# Patient Record
Sex: MALE | Race: WHITE | NOT HISPANIC OR LATINO | Employment: UNEMPLOYED | RURAL
[De-identification: names, ages, dates, MRNs, and addresses within clinical notes are randomized per-mention and may not be internally consistent; named-entity substitution may affect disease eponyms.]

---

## 2017-02-01 ENCOUNTER — OFFICE VISIT (OUTPATIENT)
Dept: FAMILY MEDICINE CLINIC | Facility: CLINIC | Age: 22
End: 2017-02-01

## 2017-02-01 VITALS
TEMPERATURE: 98.3 F | DIASTOLIC BLOOD PRESSURE: 75 MMHG | HEIGHT: 70 IN | OXYGEN SATURATION: 98 % | WEIGHT: 175.6 LBS | HEART RATE: 80 BPM | BODY MASS INDEX: 25.14 KG/M2 | SYSTOLIC BLOOD PRESSURE: 113 MMHG

## 2017-02-01 DIAGNOSIS — Z87.09 H/O CHRONIC SINUSITIS: Primary | ICD-10-CM

## 2017-02-01 PROCEDURE — 99213 OFFICE O/P EST LOW 20 MIN: CPT | Performed by: FAMILY MEDICINE

## 2017-02-01 NOTE — PROGRESS NOTES
"Jared Simpson    1995    Chief Complaint   Patient presents with   • Follow-up       Vitals:    02/01/17 1501   BP: 113/75   Pulse: 80   Temp: 98.3 °F (36.8 °C)   SpO2: 98%   Weight: 175 lb 9.6 oz (79.7 kg)   Height: 70\" (177.8 cm)       Sinusitis   This is a chronic problem. The current episode started more than 1 year ago. The problem has been resolved since onset. There has been no fever. He is experiencing no pain. Associated symptoms include congestion. Past treatments include spray decongestants (ENT-endoscopy). The treatment provided moderate relief.       Review of Systems   HENT: Positive for congestion and facial swelling.        Past Medical History   Diagnosis Date   • ADHD (attention deficit hyperactivity disorder)    • Allergic    • Anxiety    • Arm injury      right   • Depression    • Headache    • Wrist injury          Current Outpatient Prescriptions:   •  buPROPion XL (WELLBUTRIN XL) 150 MG 24 hr tablet, , Disp: , Rfl:   •  Cholecalciferol (VITAMIN D) 2000 UNITS tablet, Take 2,000 Units by mouth Daily., Disp: , Rfl:   •  CloNIDine (CATAPRES) 0.1 MG tablet, Take 0.1 mg by mouth As Needed for high blood pressure., Disp: , Rfl:   •  KRILL OIL PO, Take 1 tablet by mouth. TAKE 350MG DAILY., Disp: , Rfl:   •  lithium carbonate 150 MG capsule, 300 mg 3 (Three) Times a Day., Disp: , Rfl:   •  propranolol (INDERAL) 10 MG tablet, TAKE 1 TABLET BY MOUTH TWICE A DAY, Disp: , Rfl: 2  •  QUEtiapine (SEROquel) 100 MG tablet, TAKE 1 TABLET BY MOUTH AT BEDTIME, Disp: , Rfl: 2  •  topiramate (TOPAMAX) 100 MG tablet, Take 150 mg by mouth Daily., Disp: , Rfl:     No Known Allergies    Patient Active Problem List   Diagnosis   • Bipolar 1 disorder       Social History     Social History   • Marital status: Single     Spouse name: N/A   • Number of children: N/A   • Years of education: N/A     Social History Main Topics   • Smoking status: Current Every Day Smoker     Packs/day: 0.75     Years: 6.00     Types: " "Cigarettes   • Smokeless tobacco: None   • Alcohol use 3.6 oz/week     6 Cans of beer per week      Comment: oc.   • Drug use: Yes     Special: Marijuana      Comment: former   • Sexual activity: Defer     Other Topics Concern   • None     Social History Narrative       Past Surgical History   Procedure Laterality Date   • Tonsillectomy     • Rhinoplasty         Physical Exam   HENT:   Head: Normocephalic.   Right Ear: External ear normal.   Left Ear: External ear normal.   Mouth/Throat: Oropharynx is clear and moist.   Cardiovascular: Normal rate and regular rhythm.    Pulmonary/Chest: Effort normal and breath sounds normal.   Neurological: He is alert.   Psychiatric: He has a normal mood and affect.   Vitals reviewed.      Vitals:    02/01/17 1501   BP: 113/75   Pulse: 80   Temp: 98.3 °F (36.8 °C)   SpO2: 98%   Weight: 175 lb 9.6 oz (79.7 kg)   Height: 70\" (177.8 cm)       Jared was seen today for follow-up.    Diagnoses and all orders for this visit:    H/O chronic sinusitis        Problem List Items Addressed This Visit     None      Visit Diagnoses     H/O chronic sinusitis    -  Primary                      Chronic sinusitis-cleared by ENT-continue Flonase twice a day-had flu shot this fall            Morris Espitia MD  2/1/2017  "

## 2017-02-10 RX ORDER — FLUTICASONE PROPIONATE 50 MCG
2 SPRAY, SUSPENSION (ML) NASAL DAILY
Qty: 16 ML | Refills: 5 | Status: SHIPPED | OUTPATIENT
Start: 2017-02-10 | End: 2017-10-21 | Stop reason: SDUPTHER

## 2017-02-22 ENCOUNTER — OFFICE VISIT (OUTPATIENT)
Dept: FAMILY MEDICINE CLINIC | Facility: CLINIC | Age: 22
End: 2017-02-22

## 2017-02-22 VITALS — WEIGHT: 174 LBS | HEIGHT: 70 IN | TEMPERATURE: 99 F | BODY MASS INDEX: 24.91 KG/M2

## 2017-02-22 DIAGNOSIS — R19.7 DIARRHEA OF PRESUMED INFECTIOUS ORIGIN: Primary | ICD-10-CM

## 2017-02-22 PROCEDURE — 99213 OFFICE O/P EST LOW 20 MIN: CPT | Performed by: FAMILY MEDICINE

## 2017-02-22 NOTE — PROGRESS NOTES
"Jared Simpson    1995    Chief Complaint   Patient presents with   • Diarrhea       Vitals:    02/22/17 1404   Temp: 99 °F (37.2 °C)   TempSrc: Oral   Weight: 174 lb (78.9 kg)   Height: 70\" (177.8 cm)       Diarrhea    This is a chronic problem. The current episode started more than 1 month ago. The problem occurs 2 to 4 times per day. The problem has been unchanged. The stool consistency is described as watery. The patient states that diarrhea does not awaken him from sleep. Associated symptoms include abdominal pain. Nothing aggravates the symptoms. Risk factors: Well water exposure approximately 2 months ago. He has tried nothing for the symptoms.       Review of Systems   Gastrointestinal: Positive for abdominal pain and diarrhea.       Past Medical History   Diagnosis Date   • ADHD (attention deficit hyperactivity disorder)    • Allergic    • Anxiety    • Arm injury      right   • Depression    • Headache    • Wrist injury          Current Outpatient Prescriptions:   •  buPROPion XL (WELLBUTRIN XL) 150 MG 24 hr tablet, , Disp: , Rfl:   •  Cholecalciferol (VITAMIN D) 2000 UNITS tablet, Take 2,000 Units by mouth Daily., Disp: , Rfl:   •  CloNIDine (CATAPRES) 0.1 MG tablet, Take 0.1 mg by mouth As Needed for high blood pressure., Disp: , Rfl:   •  fluticasone (FLONASE) 50 MCG/ACT nasal spray, 2 sprays into each nostril Daily., Disp: 16 mL, Rfl: 5  •  KRILL OIL PO, Take 1 tablet by mouth. TAKE 350MG DAILY., Disp: , Rfl:   •  lithium carbonate 150 MG capsule, 300 mg 3 (Three) Times a Day., Disp: , Rfl:   •  propranolol (INDERAL) 10 MG tablet, TAKE 1 TABLET BY MOUTH TWICE A DAY, Disp: , Rfl: 2  •  QUEtiapine (SEROquel) 100 MG tablet, TAKE 1 TABLET BY MOUTH AT BEDTIME, Disp: , Rfl: 2  •  topiramate (TOPAMAX) 100 MG tablet, Take 150 mg by mouth Daily., Disp: , Rfl:     No Known Allergies    Patient Active Problem List   Diagnosis   • Bipolar 1 disorder       Social History     Social History   • Marital status: " "Single     Spouse name: N/A   • Number of children: N/A   • Years of education: N/A     Social History Main Topics   • Smoking status: Current Every Day Smoker     Packs/day: 0.75     Years: 6.00     Types: Cigarettes   • Smokeless tobacco: None   • Alcohol use 3.6 oz/week     6 Cans of beer per week      Comment: oc.   • Drug use: Yes     Special: Marijuana      Comment: former   • Sexual activity: Defer     Other Topics Concern   • None     Social History Narrative       Past Surgical History   Procedure Laterality Date   • Tonsillectomy     • Rhinoplasty         Physical Exam   Constitutional: He appears well-developed and well-nourished.   Cardiovascular: Normal rate and regular rhythm.    Pulmonary/Chest: Effort normal and breath sounds normal.   Abdominal: Soft. Bowel sounds are normal.   No focality   Neurological: He is alert.   Psychiatric: He has a normal mood and affect.   Vitals reviewed.      Vitals:    02/22/17 1404   Temp: 99 °F (37.2 °C)   TempSrc: Oral   Weight: 174 lb (78.9 kg)   Height: 70\" (177.8 cm)       Jared was seen today for diarrhea.    Diagnoses and all orders for this visit:    Diarrhea of presumed infectious origin  -     CBC & Differential  -     TSH  -     T4, free  -     Comprehensive Metabolic Panel  -     Hepatitis panel, acute        Problem List Items Addressed This Visit     None      Visit Diagnoses     Diarrhea of presumed infectious origin    -  Primary    Relevant Orders    CBC & Differential    TSH    T4, free    Comprehensive Metabolic Panel    Hepatitis panel, acute                    Patient was at a rehabilitation center in [place] and numerous people had abdominal complaints.  This facility had well water.    Plan above plus stool for PCR evaluation                  Morris Espitia MD  2/22/2017  "

## 2017-02-24 LAB
ALBUMIN SERPL-MCNC: 4.9 G/DL (ref 3.5–5)
ALBUMIN/GLOB SERPL: 2.2 G/DL (ref 1.1–2.5)
ALP SERPL-CCNC: 80 U/L (ref 24–120)
ALT SERPL-CCNC: 30 U/L (ref 0–54)
AST SERPL-CCNC: 24 U/L (ref 7–45)
BASOPHILS # BLD AUTO: 0.03 10*3/MM3 (ref 0–0.2)
BASOPHILS NFR BLD AUTO: 0.3 % (ref 0–2)
BILIRUB SERPL-MCNC: 0.4 MG/DL (ref 0.1–1)
BUN SERPL-MCNC: 14 MG/DL (ref 5–21)
BUN/CREAT SERPL: 10.9 (ref 7–25)
CALCIUM SERPL-MCNC: 9.9 MG/DL (ref 8.4–10.4)
CHLORIDE SERPL-SCNC: 104 MMOL/L (ref 98–110)
CO2 SERPL-SCNC: 25 MMOL/L (ref 24–31)
CREAT SERPL-MCNC: 1.28 MG/DL (ref 0.5–1.4)
EOSINOPHIL # BLD AUTO: 0.48 10*3/MM3 (ref 0–0.7)
EOSINOPHIL NFR BLD AUTO: 4.7 % (ref 0–4)
ERYTHROCYTE [DISTWIDTH] IN BLOOD BY AUTOMATED COUNT: 14.5 % (ref 12–15)
GLOBULIN SER CALC-MCNC: 2.2 GM/DL
GLUCOSE SERPL-MCNC: 85 MG/DL (ref 70–100)
HAV IGM SERPL QL IA: NEGATIVE
HBV CORE IGM SERPL QL IA: NEGATIVE
HBV SURFACE AG SERPL QL IA: NEGATIVE
HCT VFR BLD AUTO: 46.3 % (ref 40–52)
HCV AB S/CO SERPL IA: <0.1 S/CO RATIO (ref 0–0.9)
HGB BLD-MCNC: 15.5 G/DL (ref 14–18)
LYMPHOCYTES # BLD AUTO: 1.88 10*3/MM3 (ref 0.72–4.86)
LYMPHOCYTES NFR BLD AUTO: 18.4 % (ref 15–45)
MCH RBC QN AUTO: 33 PG (ref 28–32)
MCHC RBC AUTO-ENTMCNC: 33.5 G/DL (ref 33–36)
MCV RBC AUTO: 98.5 FL (ref 82–95)
MONOCYTES # BLD AUTO: 0.87 10*3/MM3 (ref 0.19–1.3)
MONOCYTES NFR BLD AUTO: 8.5 % (ref 4–12)
NEUTROPHILS # BLD AUTO: 6.93 10*3/MM3 (ref 1.87–8.4)
NEUTROPHILS NFR BLD AUTO: 68.1 % (ref 39–78)
PLATELET # BLD AUTO: 269 10*3/MM3 (ref 130–400)
POTASSIUM SERPL-SCNC: 5 MMOL/L (ref 3.5–5.3)
PROT SERPL-MCNC: 7.1 G/DL (ref 6.3–8.7)
RBC # BLD AUTO: 4.7 10*6/MM3 (ref 4.8–5.9)
SODIUM SERPL-SCNC: 140 MMOL/L (ref 135–145)
T4 FREE SERPL-MCNC: 0.84 NG/DL (ref 0.78–2.19)
TSH SERPL DL<=0.005 MIU/L-ACNC: 2.77 MIU/ML (ref 0.47–4.68)
WBC # BLD AUTO: 10.19 10*3/MM3 (ref 4.8–10.8)

## 2017-03-01 DIAGNOSIS — R19.7 DIARRHEA, UNSPECIFIED TYPE: Primary | ICD-10-CM

## 2017-04-03 ENCOUNTER — OFFICE VISIT (OUTPATIENT)
Dept: GASTROENTEROLOGY | Facility: CLINIC | Age: 22
End: 2017-04-03

## 2017-04-03 VITALS
RESPIRATION RATE: 18 BRPM | SYSTOLIC BLOOD PRESSURE: 118 MMHG | OXYGEN SATURATION: 99 % | DIASTOLIC BLOOD PRESSURE: 84 MMHG | HEIGHT: 70 IN | BODY MASS INDEX: 24.54 KG/M2 | HEART RATE: 79 BPM | TEMPERATURE: 97.6 F | WEIGHT: 171.4 LBS

## 2017-04-03 DIAGNOSIS — R19.7 DIARRHEA, UNSPECIFIED TYPE: Primary | ICD-10-CM

## 2017-04-03 DIAGNOSIS — Z80.0 FAMILY HISTORY OF COLON CANCER: ICD-10-CM

## 2017-04-03 DIAGNOSIS — R10.9 ABDOMINAL CRAMPING: ICD-10-CM

## 2017-04-03 PROBLEM — R63.4 WEIGHT LOSS: Status: RESOLVED | Noted: 2017-04-03 | Resolved: 2017-04-03

## 2017-04-03 PROBLEM — R63.4 WEIGHT LOSS: Status: ACTIVE | Noted: 2017-04-03

## 2017-04-03 PROCEDURE — 99204 OFFICE O/P NEW MOD 45 MIN: CPT | Performed by: NURSE PRACTITIONER

## 2017-04-03 RX ORDER — RISPERIDONE 0.25 MG/1
0.12 TABLET, ORALLY DISINTEGRATING ORAL NIGHTLY
Refills: 2 | COMMUNITY
Start: 2017-03-19 | End: 2021-12-28 | Stop reason: SINTOL

## 2017-04-03 RX ORDER — SODIUM, POTASSIUM,MAG SULFATES 17.5-3.13G
2 SOLUTION, RECONSTITUTED, ORAL ORAL ONCE
Qty: 2 BOTTLE | Refills: 0 | Status: SHIPPED | OUTPATIENT
Start: 2017-04-03 | End: 2017-04-03

## 2017-04-03 NOTE — PATIENT INSTRUCTIONS
Diarrhea, Adult  Diarrhea is frequent loose and watery bowel movements. Diarrhea can make you feel weak and cause you to become dehydrated. Dehydration can make you tired and thirsty, cause you to have a dry mouth, and decrease how often you urinate. Diarrhea typically lasts 2-3 days. However, it can last longer if it is a sign of something more serious. It is important to treat your diarrhea as told by your health care provider.  HOME CARE INSTRUCTIONS  Eating and Drinking  Follow these recommendations as told by your health care provider:  · Take an oral rehydration solution (ORS). This is a drink that is sold at pharmacies and retail stores.  · Drink clear fluids, such as water, ice chips, diluted fruit juice, and low-calorie sports drinks.  · Eat bland, easy-to-digest foods in small amounts as you are able. These foods include bananas, applesauce, rice, lean meats, toast, and crackers.  · Avoid drinking fluids that contain a lot of sugar or caffeine, such as energy drinks, sports drinks, and soda.  · Avoid alcohol.  · Avoid spicy or fatty foods.  General Instructions  · Drink enough fluid to keep your urine clear or pale yellow.  · Wash your hands often. If soap and water are not available, use hand .  · Make sure that all people in your household wash their hands well and often.  · Take over-the-counter and prescription medicines only as told by your health care provider.  · Rest at home while you recover.  · Watch your condition for any changes.  · Take a warm bath to relieve any burning or pain from frequent diarrhea episodes.  · Keep all follow-up visits as told by your health care provider. This is important.  SEEK MEDICAL CARE IF:  · You have a fever.  · Your diarrhea gets worse.  · You have new symptoms.  · You cannot keep fluids down.  · You feel light-headed or dizzy.  · You have a headache  · You have muscle cramps.  SEEK IMMEDIATE MEDICAL CARE IF:  · You have chest pain.  · You feel extremely  weak or you faint.  · You have bloody or black stools or stools that look like tar.  · You have severe pain, cramping, or bloating in your abdomen.  · You have trouble breathing or you are breathing very quickly.  · Your heart is beating very quickly.  · Your skin feels cold and clammy.  · You feel confused.  · You have signs of dehydration, such as:    Dark urine, very little urine, or no urine.    Cracked lips.    Dry mouth.    Sunken eyes.    Sleepiness.    Weakness.     This information is not intended to replace advice given to you by your health care provider. Make sure you discuss any questions you have with your health care provider.     Document Released: 12/08/2003 Document Revised: 01/13/2017 Document Reviewed: 08/23/2016  [employer] Interactive Patient Education ©2016 [employer]  Irritable Bowel Syndrome, Adult  Irritable bowel syndrome (IBS) is not one specific disease. It is a group of symptoms that affects the organs responsible for digestion (gastrointestinal or GI tract).   To regulate how your GI tract works, your body sends signals back and forth between your intestines and your brain. If you have IBS, there may be a problem with these signals. As a result, your GI tract does not function normally. Your intestines may become more sensitive and overreact to certain things. This is especially true when you eat certain foods or when you are under stress.   There are four types of IBS. These may be determined based on the consistency of your stool:   · IBS with diarrhea.    · IBS with constipation.    · Mixed IBS.    · Unsubtyped IBS.    It is important to know which type of IBS you have. Some treatments are more likely to be helpful for certain types of IBS.   CAUSES   The exact cause of IBS is not known.  RISK FACTORS  You may have a higher risk of IBS if:  · You are a woman.  · You are younger than 45 years old.  · You have a family history of IBS.  · You have mental health problems.  · You have  had bacterial infection of your GI tract.  SIGNS AND SYMPTOMS   Symptoms of IBS vary from person to person. The main symptom is abdominal pain or discomfort. Additional symptoms usually include one or more of the following:   · Diarrhea, constipation, or both.    · Abdominal swelling or bloating.    · Feeling full or sick after eating a small or regular-size meal.    · Frequent gas.    · Mucus in the stool.    · A feeling of having more stool left after a bowel movement.    Symptoms tend to come and go. They may be associated with stress, psychiatric conditions, or nothing at all.   DIAGNOSIS   There is no specific test to diagnose IBS. Your health care provider will make a diagnosis based on a physical exam, medical history, and your symptoms. You may have other tests to rule out other conditions that may be causing your symptoms. These may include:   · Blood tests.    · X-rays.    · CT scan.  · Endoscopy and colonoscopy. This is a test in which your GI tract is viewed with a long, thin, flexible tube.  TREATMENT  There is no cure for IBS, but treatment can help relieve symptoms. IBS treatment often includes:   · Changes to your diet, such as:    Eating more fiber.    Avoiding foods that cause symptoms.    Drinking more water.    Eating regular, medium-sized portioned meals.  · Medicines. These may include:    Fiber supplements if you have constipation.    Medicine to control diarrhea (antidiarrheal medicines).    Medicine to help control muscle spasms in your GI tract (antispasmodic medicines).    Medicines to help with any mental health issues, such as antidepressants or tranquilizers.  · Therapy.    Talk therapy may help with anxiety, depression, or other mental health issues that can make IBS symptoms worse.  · Stress reduction.    Managing your stress can help keep symptoms under control.  HOME CARE INSTRUCTIONS   · Take medicines only as directed by your health care provider.  · Eat a healthy diet.    Avoid  foods and drinks with added sugar.    Include more whole grains, fruits, and vegetables gradually into your diet. This may be especially helpful if you have IBS with constipation.    Avoid any foods and drinks that make your symptoms worse. These may include dairy products and caffeinated or carbonated drinks.    Do not eat large meals.    Drink enough fluid to keep your urine clear or pale yellow.  · Exercise regularly. Ask your health care provider for recommendations of good activities for you.  · Keep all follow-up visits as directed by your health care provider. This is important.  SEEK MEDICAL CARE IF:   · You have constant pain.  · You have trouble or pain with swallowing.  · You have worsening diarrhea.  SEEK IMMEDIATE MEDICAL CARE IF:   · You have severe and worsening abdominal pain.    · You have diarrhea and:      You have a rash, stiff neck, or severe headache.      You are irritable, sleepy, or difficult to awaken.      You are weak, dizzy, or extremely thirsty.    · You have bright red blood in your stool or you have black tarry stools.    · You have unusual abdominal swelling that is painful.    · You vomit continuously.    · You vomit blood (hematemesis).    · You have both abdominal pain and a fever.         This information is not intended to replace advice given to you by your health care provider. Make sure you discuss any questions you have with your health care provider.     Document Released: 12/18/2006 Document Revised: 01/08/2016 Document Reviewed: 09/04/2015  [employer] Patient Education ©2016 [employer]

## 2017-04-03 NOTE — PROGRESS NOTES
Chief Complaint:   Chief Complaint   Patient presents with   • Diarrhea     happens after eating/    • tightness     on right side of abdomin          Patient ID: Jared Simpson is a 22 y.o. male     History of Present Illness:    This is a very pleasant 22-year-old male who was referred to our office by Dr. Trino santos with complaints of diarrhea, and abdominal cramping.  The patient states that his symptoms started approximately 1 month ago.  He states that it occurs 2-4 times a day.  He states that is worse with eating.  He states that soon after he eats he begins to have abdominal cramping and muscular to the bathroom quickly.  He states that he has really not found any type of relieving factors for this.    He denies any nausea, vomiting, epigastric pain or dysphagia.  He denies any fever or chills.  He denies any bright red blood per rectum or black tarry stools.  He denies any unintentional weight loss or loss of appetite.  He does state that he has a family history of colon cancer in a grandparent but is not sure which one.        Past Medical History:   Diagnosis Date   • ADHD (attention deficit hyperactivity disorder)    • Allergic    • Anxiety    • Arm injury     right   • Depression    • Headache    • Wrist injury        Past Surgical History:   Procedure Laterality Date   • RHINOPLASTY     • TONSILLECTOMY           Current Outpatient Prescriptions:   •  buPROPion XL (WELLBUTRIN XL) 150 MG 24 hr tablet, , Disp: , Rfl:   •  Cholecalciferol (VITAMIN D) 2000 UNITS tablet, Take 2,000 Units by mouth Daily., Disp: , Rfl:   •  lithium carbonate 150 MG capsule, 300 mg 3 (Three) Times a Day., Disp: , Rfl:   •  propranolol (INDERAL) 10 MG tablet, TAKE 1 TABLET BY MOUTH TWICE A DAY, Disp: , Rfl: 2  •  QUEtiapine (SEROquel) 100 MG tablet, TAKE 1 TABLET BY MOUTH AT BEDTIME, Disp: , Rfl: 2  •  topiramate (TOPAMAX) 100 MG tablet, Take 150 mg by mouth Daily., Disp: , Rfl:   •  CloNIDine (CATAPRES) 0.1 MG tablet, Take 0.1  "mg by mouth As Needed for high blood pressure., Disp: , Rfl:   •  fluticasone (FLONASE) 50 MCG/ACT nasal spray, 2 sprays into each nostril Daily., Disp: 16 mL, Rfl: 5  •  KRILL OIL PO, Take 1 tablet by mouth. TAKE 350MG DAILY., Disp: , Rfl:   •  risperiDONE (risperDAL) 1 MG tablet, TAKE 1 TABLET BY MOUTH AT BEDTIME, Disp: , Rfl: 2  •  sodium-potassium-magnesium sulfates (SUPREP BOWEL PREP) solution oral solution, Take 2 bottles by mouth 1 (One) Time for 1 dose. Split dose prep as directed by office instructions provided.  2 bottles = one kit., Disp: 2 bottle, Rfl: 0    No Known Allergies    Social History     Social History   • Marital status: Single     Spouse name: N/A   • Number of children: N/A   • Years of education: N/A     Occupational History   • Not on file.     Social History Main Topics   • Smoking status: Current Every Day Smoker     Packs/day: 0.75     Years: 6.00     Types: Cigarettes   • Smokeless tobacco: Not on file   • Alcohol use 3.6 oz/week     6 Cans of beer per week      Comment: oc.   • Drug use: Yes     Special: Marijuana      Comment: former   • Sexual activity: Defer     Other Topics Concern   • Not on file     Social History Narrative       History reviewed. No pertinent family history.    Vitals:    04/03/17 0850   BP: 118/84   BP Location: Left arm   Patient Position: Sitting   Cuff Size: Adult   Pulse: 79   Resp: 18   Temp: 97.6 °F (36.4 °C)   SpO2: 99%   Weight: 171 lb 6.4 oz (77.7 kg)   Height: 70\" (177.8 cm)       Review of Systems:    General:    Present -feeling well   Skin:    Not Present-Rash   HEENT:     Not Present-Acute visual changes or Acute hearing changes   Neck :    Not Present- swollen glands   Genitourinary:      Not Present- burning, frequency, urgency hematuria, dysuria,   Cardiovascular:   Not Present-chest pain, palpitations, or pressure   Respiratory:   Not Present- shortness of breath or cough   Gastrointestinal:  Musculoskeletal:  Neurological:  Psychiatric:   " Present as mentioned in the HP    Not Present. Recent gait disturbances.    Not Present-Seizures and weakness in extremities.    Not Present- Anxiety or Depression.       Physical Exam:    General Appearance:    Alert, cooperative, in no acute distress   Psych:    Mood appropriate    Eyes:          conjunctivae and sclerae normal, no   icterus, no pallor   ENMT:    Ears appear intact with no abnormalities noted oral mucosa moist   Neck:   No adenopathy, supple, trachea midline, no thyromegaly, no   carotid bruit, no JVD    Cardiovascular:    Regular rhythm and normal rate, normal S1 and S2, no            murmur, no gallop, no rub, no click   Gastrointestinal:     Inspection normal.  Normal bowel sounds, no masses, no organomegaly, soft round non-tender, non-distended, no guarding, no rebound or tenderness. No hepatosplenomegaly.   Skin:   No bleeding, bruising or rash   Neurologic:   nonfocal       Lab Results   Component Value Date    WBC 10.19 02/22/2017    HGB 15.5 02/22/2017    HCT 46.3 02/22/2017     02/22/2017        Lab Results   Component Value Date     02/22/2017    K 5.0 02/22/2017     02/22/2017    CO2 25.0 02/22/2017    BUN 14 02/22/2017    CREATININE 1.28 02/22/2017    BILITOT 0.4 02/22/2017    ALKPHOS 80 02/22/2017    ALT 30 02/22/2017    AST 24 02/22/2017       No results found for: INR    Assessment and Plan:    Jared was seen today for diarrhea and tightness.    Diagnoses and all orders for this visit:    Diarrhea, unspecified type  -     Case request colonoscopy    Abdominal cramping  -     Case request    Family history of colon cancer    Other orders  -     sodium-potassium-magnesium sulfates (SUPREP BOWEL PREP) solution oral solution; Take 2 bottles by mouth 1 (One) Time for 1 dose. Split dose prep as directed by office instructions provided.  2 bottles = one kit.        Patient Instructions   Diarrhea, Adult  Diarrhea is frequent loose and watery bowel movements. Diarrhea can  make you feel weak and cause you to become dehydrated. Dehydration can make you tired and thirsty, cause you to have a dry mouth, and decrease how often you urinate. Diarrhea typically lasts 2-3 days. However, it can last longer if it is a sign of something more serious. It is important to treat your diarrhea as told by your health care provider.  HOME CARE INSTRUCTIONS  Eating and Drinking  Follow these recommendations as told by your health care provider:  · Take an oral rehydration solution (ORS). This is a drink that is sold at pharmacies and retail stores.  · Drink clear fluids, such as water, ice chips, diluted fruit juice, and low-calorie sports drinks.  · Eat bland, easy-to-digest foods in small amounts as you are able. These foods include bananas, applesauce, rice, lean meats, toast, and crackers.  · Avoid drinking fluids that contain a lot of sugar or caffeine, such as energy drinks, sports drinks, and soda.  · Avoid alcohol.  · Avoid spicy or fatty foods.  General Instructions  · Drink enough fluid to keep your urine clear or pale yellow.  · Wash your hands often. If soap and water are not available, use hand .  · Make sure that all people in your household wash their hands well and often.  · Take over-the-counter and prescription medicines only as told by your health care provider.  · Rest at home while you recover.  · Watch your condition for any changes.  · Take a warm bath to relieve any burning or pain from frequent diarrhea episodes.  · Keep all follow-up visits as told by your health care provider. This is important.  SEEK MEDICAL CARE IF:  · You have a fever.  · Your diarrhea gets worse.  · You have new symptoms.  · You cannot keep fluids down.  · You feel light-headed or dizzy.  · You have a headache  · You have muscle cramps.  SEEK IMMEDIATE MEDICAL CARE IF:  · You have chest pain.  · You feel extremely weak or you faint.  · You have bloody or black stools or stools that look like  tar.  · You have severe pain, cramping, or bloating in your abdomen.  · You have trouble breathing or you are breathing very quickly.  · Your heart is beating very quickly.  · Your skin feels cold and clammy.  · You feel confused.  · You have signs of dehydration, such as:    Dark urine, very little urine, or no urine.    Cracked lips.    Dry mouth.    Sunken eyes.    Sleepiness.    Weakness.     This information is not intended to replace advice given to you by your health care provider. Make sure you discuss any questions you have with your health care provider.     Document Released: 12/08/2003 Document Revised: 01/13/2017 Document Reviewed: 08/23/2016  [employer] Interactive Patient Education ©2016 [employer]  Irritable Bowel Syndrome, Adult  Irritable bowel syndrome (IBS) is not one specific disease. It is a group of symptoms that affects the organs responsible for digestion (gastrointestinal or GI tract).   To regulate how your GI tract works, your body sends signals back and forth between your intestines and your brain. If you have IBS, there may be a problem with these signals. As a result, your GI tract does not function normally. Your intestines may become more sensitive and overreact to certain things. This is especially true when you eat certain foods or when you are under stress.   There are four types of IBS. These may be determined based on the consistency of your stool:   · IBS with diarrhea.    · IBS with constipation.    · Mixed IBS.    · Unsubtyped IBS.    It is important to know which type of IBS you have. Some treatments are more likely to be helpful for certain types of IBS.   CAUSES   The exact cause of IBS is not known.  RISK FACTORS  You may have a higher risk of IBS if:  · You are a woman.  · You are younger than 45 years old.  · You have a family history of IBS.  · You have mental health problems.  · You have had bacterial infection of your GI tract.  SIGNS AND SYMPTOMS   Symptoms of IBS  vary from person to person. The main symptom is abdominal pain or discomfort. Additional symptoms usually include one or more of the following:   · Diarrhea, constipation, or both.    · Abdominal swelling or bloating.    · Feeling full or sick after eating a small or regular-size meal.    · Frequent gas.    · Mucus in the stool.    · A feeling of having more stool left after a bowel movement.    Symptoms tend to come and go. They may be associated with stress, psychiatric conditions, or nothing at all.   DIAGNOSIS   There is no specific test to diagnose IBS. Your health care provider will make a diagnosis based on a physical exam, medical history, and your symptoms. You may have other tests to rule out other conditions that may be causing your symptoms. These may include:   · Blood tests.    · X-rays.    · CT scan.  · Endoscopy and colonoscopy. This is a test in which your GI tract is viewed with a long, thin, flexible tube.  TREATMENT  There is no cure for IBS, but treatment can help relieve symptoms. IBS treatment often includes:   · Changes to your diet, such as:    Eating more fiber.    Avoiding foods that cause symptoms.    Drinking more water.    Eating regular, medium-sized portioned meals.  · Medicines. These may include:    Fiber supplements if you have constipation.    Medicine to control diarrhea (antidiarrheal medicines).    Medicine to help control muscle spasms in your GI tract (antispasmodic medicines).    Medicines to help with any mental health issues, such as antidepressants or tranquilizers.  · Therapy.    Talk therapy may help with anxiety, depression, or other mental health issues that can make IBS symptoms worse.  · Stress reduction.    Managing your stress can help keep symptoms under control.  HOME CARE INSTRUCTIONS   · Take medicines only as directed by your health care provider.  · Eat a healthy diet.    Avoid foods and drinks with added sugar.    Include more whole grains, fruits, and  vegetables gradually into your diet. This may be especially helpful if you have IBS with constipation.    Avoid any foods and drinks that make your symptoms worse. These may include dairy products and caffeinated or carbonated drinks.    Do not eat large meals.    Drink enough fluid to keep your urine clear or pale yellow.  · Exercise regularly. Ask your health care provider for recommendations of good activities for you.  · Keep all follow-up visits as directed by your health care provider. This is important.  SEEK MEDICAL CARE IF:   · You have constant pain.  · You have trouble or pain with swallowing.  · You have worsening diarrhea.  SEEK IMMEDIATE MEDICAL CARE IF:   · You have severe and worsening abdominal pain.    · You have diarrhea and:      You have a rash, stiff neck, or severe headache.      You are irritable, sleepy, or difficult to awaken.      You are weak, dizzy, or extremely thirsty.    · You have bright red blood in your stool or you have black tarry stools.    · You have unusual abdominal swelling that is painful.    · You vomit continuously.    · You vomit blood (hematemesis).    · You have both abdominal pain and a fever.         This information is not intended to replace advice given to you by your health care provider. Make sure you discuss any questions you have with your health care provider.     Document Released: 12/18/2006 Document Revised: 01/08/2016 Document Reviewed: 09/04/2015  [employer] Interactive Patient Education ©2016 [employer]        Next follow-up appointment    The risks, benefits, and alternatives of colonoscopy were reviewed with the patient today.  Risks including perforation of the colon possibly requiring surgery or colostomy.  Additional risks include risk of bleeding from biopsies or removal of colon tissue.  There is also the risk of a drug reaction or problems with anesthesia.  This will be discussed with the further by the anesthesia team on the day of the  procedure.  Lastly there is a possibility of missing a colon polyp or cancer.  The benefits include the diagnosis and management of disease of the colon and rectum.  Alternatives to colonoscopy include barium enema, laboratory testing, radiographic evaluation, or no intervention.  The patient verbalizes understanding and agrees.      EMR Dragon/Transcription disclaimer:  Much of this encounter note is an electronic transcription/translation of spoken language to printed text. The electronic translation of spoken language may permit erroneous, or at times, nonsensical words or phrases to be inadvertently transcribed; although I have reviewed the note for such errors, some may still exist.

## 2017-04-13 LAB — LITHIUM SERPL-SCNC: 0.4 MMOL/L (ref 0.6–1.2)

## 2017-04-25 ENCOUNTER — OFFICE VISIT (OUTPATIENT)
Dept: FAMILY MEDICINE CLINIC | Facility: CLINIC | Age: 22
End: 2017-04-25

## 2017-04-25 VITALS
TEMPERATURE: 98.8 F | SYSTOLIC BLOOD PRESSURE: 110 MMHG | DIASTOLIC BLOOD PRESSURE: 80 MMHG | WEIGHT: 164.4 LBS | BODY MASS INDEX: 23.54 KG/M2 | HEIGHT: 70 IN | HEART RATE: 79 BPM | OXYGEN SATURATION: 97 %

## 2017-04-25 DIAGNOSIS — J06.9 ACUTE URI: Primary | ICD-10-CM

## 2017-04-25 PROCEDURE — 99213 OFFICE O/P EST LOW 20 MIN: CPT | Performed by: FAMILY MEDICINE

## 2017-04-25 PROCEDURE — 96372 THER/PROPH/DIAG INJ SC/IM: CPT | Performed by: FAMILY MEDICINE

## 2017-04-25 RX ORDER — CEFTRIAXONE SODIUM 250 MG/1
250 INJECTION, POWDER, FOR SOLUTION INTRAMUSCULAR; INTRAVENOUS ONCE
Status: COMPLETED | OUTPATIENT
Start: 2017-04-25 | End: 2017-04-25

## 2017-04-25 RX ORDER — AMOXICILLIN 500 MG/1
500 CAPSULE ORAL 3 TIMES DAILY
Qty: 21 CAPSULE | Refills: 0 | Status: SHIPPED | OUTPATIENT
Start: 2017-04-25 | End: 2017-05-02

## 2017-04-25 RX ADMIN — CEFTRIAXONE SODIUM 250 MG: 250 INJECTION, POWDER, FOR SOLUTION INTRAMUSCULAR; INTRAVENOUS at 13:32

## 2017-04-25 NOTE — PROGRESS NOTES
"Jared Simpson    1995    Chief Complaint   Patient presents with   • URI   • Earache     Bilateral       Vitals:    04/25/17 1316   BP: 110/80   BP Location: Right arm   Patient Position: Sitting   Cuff Size: Adult   Pulse: 79   Temp: 98.8 °F (37.1 °C)   TempSrc: Oral   SpO2: 97%   Weight: 164 lb 6.4 oz (74.6 kg)   Height: 70\" (177.8 cm)       URI    This is a new problem. The current episode started in the past 7 days. The problem has been gradually worsening. The maximum temperature recorded prior to his arrival was 100.4 - 100.9 F. Associated symptoms include congestion, coughing, ear pain, sinus pain and sneezing. Pertinent negatives include no diarrhea or wheezing. He has tried antihistamine for the symptoms. The treatment provided no relief.       Review of Systems   HENT: Positive for congestion, ear pain and sneezing.    Respiratory: Positive for cough. Negative for shortness of breath, wheezing and stridor.    Gastrointestinal: Negative for diarrhea.       Past Medical History:   Diagnosis Date   • ADHD (attention deficit hyperactivity disorder)    • Allergic    • Anxiety    • Arm injury     right   • Depression    • Headache    • Wrist injury          Current Outpatient Prescriptions:   •  buPROPion XL (WELLBUTRIN XL) 150 MG 24 hr tablet, , Disp: , Rfl:   •  Cholecalciferol (VITAMIN D) 2000 UNITS tablet, Take 2,000 Units by mouth Daily., Disp: , Rfl:   •  CloNIDine (CATAPRES) 0.1 MG tablet, Take 0.1 mg by mouth As Needed for high blood pressure., Disp: , Rfl:   •  fluticasone (FLONASE) 50 MCG/ACT nasal spray, 2 sprays into each nostril Daily., Disp: 16 mL, Rfl: 5  •  KRILL OIL PO, Take 1 tablet by mouth. TAKE 350MG DAILY., Disp: , Rfl:   •  lithium carbonate 150 MG capsule, 300 mg 3 (Three) Times a Day., Disp: , Rfl:   •  propranolol (INDERAL) 10 MG tablet, TAKE 1 TABLET BY MOUTH TWICE A DAY, Disp: , Rfl: 2  •  QUEtiapine (SEROquel) 100 MG tablet, TAKE 1 TABLET BY MOUTH AT BEDTIME, Disp: , Rfl: 2  •  " risperiDONE (risperDAL) 1 MG tablet, TAKE 1 TABLET BY MOUTH AT BEDTIME, Disp: , Rfl: 2  •  topiramate (TOPAMAX) 100 MG tablet, Take 150 mg by mouth Daily., Disp: , Rfl:   •  amoxicillin (AMOXIL) 500 MG capsule, Take 1 capsule by mouth 3 (Three) Times a Day for 7 days., Disp: 21 capsule, Rfl: 0    Current Facility-Administered Medications:   •  cefTRIAXone (ROCEPHIN) injection 250 mg, 250 mg, Intramuscular, Once, Morris Espitia MD    No Known Allergies    Patient Active Problem List   Diagnosis   • Bipolar 1 disorder   • Diarrhea   • Abdominal cramping   • Family history of colon cancer       Social History     Social History   • Marital status: Single     Spouse name: N/A   • Number of children: N/A   • Years of education: N/A     Social History Main Topics   • Smoking status: Current Every Day Smoker     Packs/day: 0.75     Years: 6.00     Types: Cigarettes   • Smokeless tobacco: None   • Alcohol use 3.6 oz/week     6 Cans of beer per week      Comment: oc.   • Drug use: Yes     Special: Marijuana      Comment: former   • Sexual activity: Defer     Other Topics Concern   • None     Social History Narrative       Past Surgical History:   Procedure Laterality Date   • RHINOPLASTY     • TONSILLECTOMY         Physical Exam   Constitutional: He is oriented to person, place, and time. He appears well-developed and well-nourished.   HENT:   Head: Normocephalic.   Left Ear: External ear normal.   Mouth/Throat: Oropharynx is clear and moist.   Right otitis media   Eyes: Conjunctivae are normal.   Cardiovascular: Normal rate and regular rhythm.    Pulmonary/Chest: Effort normal and breath sounds normal. He has no wheezes. He has no rales.   Lymphadenopathy:     He has no cervical adenopathy.   Neurological: He is alert and oriented to person, place, and time.   Psychiatric: He has a normal mood and affect.   Vitals reviewed.      Vitals:    04/25/17 1316   BP: 110/80   BP Location: Right arm   Patient Position:  "Sitting   Cuff Size: Adult   Pulse: 79   Temp: 98.8 °F (37.1 °C)   TempSrc: Oral   SpO2: 97%   Weight: 164 lb 6.4 oz (74.6 kg)   Height: 70\" (177.8 cm)       Jared was seen today for uri and earache.    Diagnoses and all orders for this visit:    Acute URI  -     cefTRIAXone (ROCEPHIN) injection 250 mg; Inject 250 mg into the shoulder, thigh, or buttocks 1 (One) Time.    Other orders  -     amoxicillin (AMOXIL) 500 MG capsule; Take 1 capsule by mouth 3 (Three) Times a Day for 7 days.        Problem List Items Addressed This Visit     None      Visit Diagnoses     Acute URI    -  Primary    Relevant Medications    cefTRIAXone (ROCEPHIN) injection 250 mg (Start on 4/25/2017  2:00 PM)    amoxicillin (AMOXIL) 500 MG capsule                    Plan above plus start Flonase for the spring-right otitis complicating URI                  Morris Espitia MD  4/25/2017  "

## 2017-04-26 ENCOUNTER — LAB (OUTPATIENT)
Dept: FAMILY MEDICINE CLINIC | Facility: CLINIC | Age: 22
End: 2017-04-26

## 2017-04-26 DIAGNOSIS — A64 SEXUALLY TRANSMITTED DISEASE: Primary | ICD-10-CM

## 2017-04-27 ENCOUNTER — TELEPHONE (OUTPATIENT)
Dept: FAMILY MEDICINE CLINIC | Facility: CLINIC | Age: 22
End: 2017-04-27

## 2017-04-27 LAB
ALBUMIN SERPL-MCNC: 4.5 G/DL (ref 3.5–5.5)
ALBUMIN/GLOB SERPL: 2.5 {RATIO} (ref 1.2–2.2)
ALP SERPL-CCNC: 89 IU/L (ref 39–117)
ALT SERPL-CCNC: 15 IU/L (ref 0–44)
AST SERPL-CCNC: 16 IU/L (ref 0–40)
BILIRUB SERPL-MCNC: <0.2 MG/DL (ref 0–1.2)
BUN SERPL-MCNC: 6 MG/DL (ref 6–20)
BUN/CREAT SERPL: 5 (ref 9–20)
CALCIUM SERPL-MCNC: 9.1 MG/DL (ref 8.7–10.2)
CHLORIDE SERPL-SCNC: 103 MMOL/L (ref 96–106)
CO2 SERPL-SCNC: 22 MMOL/L (ref 18–29)
CREAT SERPL-MCNC: 1.19 MG/DL (ref 0.76–1.27)
GLOBULIN SER CALC-MCNC: 1.8 G/DL (ref 1.5–4.5)
GLUCOSE SERPL-MCNC: 99 MG/DL (ref 65–99)
HIV 1+2 AB+HIV1 P24 AG SERPL QL IA: NON REACTIVE
POTASSIUM SERPL-SCNC: 5.3 MMOL/L (ref 3.5–5.2)
PROT SERPL-MCNC: 6.3 G/DL (ref 6–8.5)
RPR SER QL: NON REACTIVE
SODIUM SERPL-SCNC: 142 MMOL/L (ref 134–144)

## 2017-05-23 ENCOUNTER — ANESTHESIA (OUTPATIENT)
Dept: GASTROENTEROLOGY | Facility: HOSPITAL | Age: 22
End: 2017-05-23

## 2017-05-23 ENCOUNTER — ANESTHESIA EVENT (OUTPATIENT)
Dept: GASTROENTEROLOGY | Facility: HOSPITAL | Age: 22
End: 2017-05-23

## 2017-05-23 ENCOUNTER — HOSPITAL ENCOUNTER (OUTPATIENT)
Facility: HOSPITAL | Age: 22
Setting detail: HOSPITAL OUTPATIENT SURGERY
Discharge: HOME | End: 2017-05-23
Attending: INTERNAL MEDICINE | Admitting: INTERNAL MEDICINE

## 2017-05-23 VITALS
BODY MASS INDEX: 22.76 KG/M2 | DIASTOLIC BLOOD PRESSURE: 66 MMHG | RESPIRATION RATE: 14 BRPM | HEART RATE: 71 BPM | OXYGEN SATURATION: 100 % | WEIGHT: 159 LBS | HEIGHT: 70 IN | TEMPERATURE: 97.9 F | SYSTOLIC BLOOD PRESSURE: 106 MMHG

## 2017-05-23 DIAGNOSIS — R10.9 ABDOMINAL CRAMPING: ICD-10-CM

## 2017-05-23 DIAGNOSIS — R19.7 DIARRHEA, UNSPECIFIED TYPE: ICD-10-CM

## 2017-05-23 PROCEDURE — 88305 TISSUE EXAM BY PATHOLOGIST: CPT | Performed by: INTERNAL MEDICINE

## 2017-05-23 PROCEDURE — 45380 COLONOSCOPY AND BIOPSY: CPT | Performed by: INTERNAL MEDICINE

## 2017-05-23 PROCEDURE — 25010000002 PROPOFOL 10 MG/ML EMULSION: Performed by: NURSE ANESTHETIST, CERTIFIED REGISTERED

## 2017-05-23 RX ORDER — PROPOFOL 10 MG/ML
VIAL (ML) INTRAVENOUS AS NEEDED
Status: DISCONTINUED | OUTPATIENT
Start: 2017-05-23 | End: 2017-05-23 | Stop reason: SURG

## 2017-05-23 RX ORDER — SODIUM CHLORIDE 0.9 % (FLUSH) 0.9 %
1-10 SYRINGE (ML) INJECTION AS NEEDED
Status: DISCONTINUED | OUTPATIENT
Start: 2017-05-23 | End: 2017-05-23 | Stop reason: HOSPADM

## 2017-05-23 RX ORDER — SODIUM CHLORIDE 9 MG/ML
100 INJECTION, SOLUTION INTRAVENOUS CONTINUOUS
Status: DISCONTINUED | OUTPATIENT
Start: 2017-05-23 | End: 2017-05-23 | Stop reason: HOSPADM

## 2017-05-23 RX ADMIN — SODIUM CHLORIDE 100 ML/HR: 9 INJECTION, SOLUTION INTRAVENOUS at 08:22

## 2017-05-23 RX ADMIN — PROPOFOL 200 MG: 10 INJECTION, EMULSION INTRAVENOUS at 09:13

## 2017-05-23 RX ADMIN — PROPOFOL 200 MG: 10 INJECTION, EMULSION INTRAVENOUS at 09:25

## 2017-05-24 ENCOUNTER — TELEPHONE (OUTPATIENT)
Dept: GASTROENTEROLOGY | Facility: CLINIC | Age: 22
End: 2017-05-24

## 2017-05-24 LAB
CYTO UR: NORMAL
LAB AP CASE REPORT: NORMAL
LAB AP CLINICAL INFORMATION: NORMAL
Lab: NORMAL
PATH REPORT.FINAL DX SPEC: NORMAL
PATH REPORT.GROSS SPEC: NORMAL

## 2017-10-23 RX ORDER — FLUTICASONE PROPIONATE 50 MCG
SPRAY, SUSPENSION (ML) NASAL
Qty: 16 ML | Refills: 5 | Status: SHIPPED | OUTPATIENT
Start: 2017-10-23 | End: 2018-04-18 | Stop reason: SDUPTHER

## 2017-12-01 ENCOUNTER — OFFICE VISIT (OUTPATIENT)
Dept: FAMILY MEDICINE CLINIC | Facility: CLINIC | Age: 22
End: 2017-12-01

## 2017-12-01 VITALS
WEIGHT: 156.2 LBS | DIASTOLIC BLOOD PRESSURE: 58 MMHG | HEIGHT: 70 IN | HEART RATE: 75 BPM | OXYGEN SATURATION: 98 % | TEMPERATURE: 99.1 F | SYSTOLIC BLOOD PRESSURE: 106 MMHG | BODY MASS INDEX: 22.36 KG/M2

## 2017-12-01 DIAGNOSIS — J06.9 ACUTE URI: Primary | ICD-10-CM

## 2017-12-01 PROCEDURE — 99213 OFFICE O/P EST LOW 20 MIN: CPT | Performed by: FAMILY MEDICINE

## 2017-12-01 PROCEDURE — 96372 THER/PROPH/DIAG INJ SC/IM: CPT | Performed by: FAMILY MEDICINE

## 2017-12-01 RX ORDER — METHYLPREDNISOLONE ACETATE 40 MG/ML
40 INJECTION, SUSPENSION INTRA-ARTICULAR; INTRALESIONAL; INTRAMUSCULAR; SOFT TISSUE ONCE
Status: COMPLETED | OUTPATIENT
Start: 2017-12-01 | End: 2017-12-01

## 2017-12-01 RX ORDER — GUAIFENESIN 600 MG/1
600 TABLET, EXTENDED RELEASE ORAL 2 TIMES DAILY
Qty: 30 TABLET | Refills: 2 | Status: SHIPPED | OUTPATIENT
Start: 2017-12-01 | End: 2018-05-24

## 2017-12-01 RX ORDER — AZITHROMYCIN 250 MG/1
TABLET, FILM COATED ORAL
Qty: 6 TABLET | Refills: 0 | Status: SHIPPED | OUTPATIENT
Start: 2017-12-01 | End: 2018-05-24

## 2017-12-01 RX ADMIN — METHYLPREDNISOLONE ACETATE 40 MG: 40 INJECTION, SUSPENSION INTRA-ARTICULAR; INTRALESIONAL; INTRAMUSCULAR; SOFT TISSUE at 10:16

## 2017-12-01 NOTE — PROGRESS NOTES
Subjective   Jared Simpson is a 22 y.o. male.     URI    This is a new problem. The current episode started in the past 7 days. The problem has been gradually worsening. There has been no fever. Associated symptoms include congestion, coughing, a sore throat and swollen glands. He has tried antihistamine for the symptoms. The treatment provided mild relief.       The following portions of the patient's history were reviewed and updated as appropriate: allergies, current medications, past family history, past medical history, past social history, past surgical history and problem list.    Review of Systems   HENT: Positive for congestion and sore throat.    Respiratory: Positive for cough.        Objective   Physical Exam   Constitutional: He is oriented to person, place, and time. He appears well-developed and well-nourished.   HENT:   Right Ear: External ear normal.   Left Ear: External ear normal.   Mouth/Throat: Oropharynx is clear and moist.   Eyes: Conjunctivae are normal.   Cardiovascular: Normal rate and regular rhythm.    Pulmonary/Chest: Effort normal. He has rales.   Lymphadenopathy:     He has no cervical adenopathy.   Neurological: He is alert and oriented to person, place, and time.   Psychiatric: He has a normal mood and affect. His behavior is normal.   Vitals reviewed.      Assessment/Plan   Jared was seen today for uri.    Diagnoses and all orders for this visit:    Acute URI  -     methylPREDNISolone acetate (DEPO-medrol) injection 40 mg; Inject 1 mL into the shoulder, thigh, or buttocks 1 (One) Time.    Other orders  -     azithromycin (ZITHROMAX Z-MICHELLE) 250 MG tablet; Take 2 tablets the first day, then 1 tablet daily for 4 days.  -     guaiFENesin (MUCINEX) 600 MG 12 hr tablet; Take 1 tablet by mouth 2 (Two) Times a Day.       Plan above

## 2018-04-19 RX ORDER — FLUTICASONE PROPIONATE 50 MCG
SPRAY, SUSPENSION (ML) NASAL
Qty: 16 ML | Refills: 5 | Status: SHIPPED | OUTPATIENT
Start: 2018-04-19 | End: 2018-08-20

## 2018-05-24 ENCOUNTER — OFFICE VISIT (OUTPATIENT)
Dept: FAMILY MEDICINE CLINIC | Facility: CLINIC | Age: 23
End: 2018-05-24

## 2018-05-24 VITALS
DIASTOLIC BLOOD PRESSURE: 64 MMHG | BODY MASS INDEX: 22.33 KG/M2 | OXYGEN SATURATION: 99 % | HEIGHT: 70 IN | HEART RATE: 74 BPM | TEMPERATURE: 97.5 F | SYSTOLIC BLOOD PRESSURE: 110 MMHG | WEIGHT: 156 LBS

## 2018-05-24 DIAGNOSIS — Z71.1 CONCERN ABOUT STD IN MALE WITHOUT DIAGNOSIS: Primary | ICD-10-CM

## 2018-08-20 ENCOUNTER — OFFICE VISIT (OUTPATIENT)
Dept: FAMILY MEDICINE CLINIC | Facility: CLINIC | Age: 23
End: 2018-08-20

## 2018-08-20 VITALS
DIASTOLIC BLOOD PRESSURE: 68 MMHG | SYSTOLIC BLOOD PRESSURE: 118 MMHG | WEIGHT: 154.4 LBS | HEIGHT: 70 IN | HEART RATE: 82 BPM | TEMPERATURE: 98.9 F | BODY MASS INDEX: 22.1 KG/M2 | OXYGEN SATURATION: 98 %

## 2018-08-20 DIAGNOSIS — R21 RASH: Primary | ICD-10-CM

## 2018-08-20 PROCEDURE — 99212 OFFICE O/P EST SF 10 MIN: CPT | Performed by: FAMILY MEDICINE

## 2018-08-20 RX ORDER — VALACYCLOVIR HYDROCHLORIDE 1 G/1
1000 TABLET, FILM COATED ORAL 3 TIMES DAILY
Qty: 21 TABLET | Refills: 0 | Status: SHIPPED | OUTPATIENT
Start: 2018-08-20 | End: 2019-01-08 | Stop reason: SDUPTHER

## 2018-08-20 NOTE — PATIENT INSTRUCTIONS
Genital Herpes  Genital herpes is a common sexually transmitted infection (STI) that is caused by a virus. The virus spreads from person to person through sexual contact. Infection can cause itching, blisters, and sores around the genitals or rectum. Symptoms may last several days and then go away This is called an outbreak. However, the virus remains in your body, so you may have more outbreaks in the future. The time between outbreaks varies and can be months or years.  Genital herpes affects men and women. It is particularly concerning for pregnant women because the virus can be passed to the baby during delivery and can cause serious problems. Genital herpes is also a concern for people who have a weak disease-fighting (immune) system.  What are the causes?  This condition is caused by the herpes simplex virus (HSV) type 1 or type 2. The virus may spread through:  · Sexual contact with an infected person, including vaginal, anal, and oral sex.  · Contact with fluid from a herpes sore.  · The skin. This means that you can get herpes from an infected partner even if he or she does not have a visible sore or does not know that he or she is infected.    What increases the risk?  You are more likely to develop this condition if:  · You have sex with many partners.  · You do not use latex condoms during sex.    What are the signs or symptoms?  Most people do not have symptoms (asymptomatic) or have mild symptoms that may be mistaken for other skin problems. Symptoms may include:  · Small red bumps near the genitals, rectum, or mouth. These bumps turn into blisters and then turn into sores.  · Flu-like symptoms, including:  ? Fever.  ? Body aches.  ? Swollen lymph nodes.  ? Headache.  · Painful urination.  · Pain and itching in the genital area or rectal area.  · Vaginal discharge.  · Tingling or shooting pain in the legs and buttocks.    Generally, symptoms are more severe and last longer during the first (primary)  outbreak. Flu-like symptoms are also more common during the primary outbreak.  How is this diagnosed?  Genital herpes may be diagnosed based on:  · A physical exam.  · Your medical history.  · Blood tests.  · A test of a fluid sample (culture) from an open sore.    How is this treated?  There is no cure for this condition, but treatment with antiviral medicines that are taken by mouth (orally) can do the following:  · Speed up healing and relieve symptoms.  · Help to reduce the spread of the virus to sexual partners.  · Limit the chance of future outbreaks, or make future outbreaks shorter.  · Lessen symptoms of future outbreaks.    Your health care provider may also recommend pain relief medicines, such as aspirin or ibuprofen.  Follow these instructions at home:  Sexual activity  · Do not have sexual contact during active outbreaks.  · Practice safe sex. Latex condoms and female condoms may help prevent the spread of the herpes virus.  General instructions  · Keep the affected areas dry and clean.  · Take over-the-counter and prescription medicines only as told by your health care provider.  · Avoid rubbing or touching blisters and sores. If you do touch blisters or sores:  ? Wash your hands thoroughly with soap and water.  ? Do not touch your eyes afterward.  · To help relieve pain or itching, you may take the following actions as directed by your health care provider:  ? Apply a cold, wet cloth (cold compress) to affected areas 4-6 times a day.  ? Apply a substance that protects your skin and reduces bleeding (astringent).  ? Apply a gel that helps relieve pain around sores (lidocaine gel).  ? Take a warm, shallow bath that cleans the genital area (sitz bath).  · Keep all follow-up visits as told by your health care provider. This is important.  How is this prevented?  · Use condoms. Although anyone can get genital herpes during sexual contact, even with the use of a condom, a condom can provide some  protection.  · Avoid having multiple sexual partners.  · Talk with your sexual partner about any symptoms either of you may have. Also, talk with your partner about any history of STIs.  · Get tested for STIs before you have sex. Ask your partner to do the same.  · Do not have sexual contact if you have symptoms of genital herpes.  Contact a health care provider if:  · Your symptoms are not improving with medicine.  · Your symptoms return.  · You have new symptoms.  · You have a fever.  · You have abdominal pain.  · You have redness, swelling, or pain in your eye.  · You notice new sores on other parts of your body.  · You are a woman and experience bleeding between menstrual periods.  · You have had herpes and you become pregnant or plan to become pregnant.  Summary  · Genital herpes is a common sexually transmitted infection (STI) that is caused by the herpes simplex virus (HSV) type 1 or type 2.  · These viruses are most often spread through sexual contact with an infected person.  · You are more likely to develop this condition if you have sex with many partners or you have unprotected sex.  · Most people do not have symptoms (asymptomatic) or have mild symptoms that may be mistaken for other skin problems. Symptoms occur as outbreaks that may happen months or years apart.  · There is no cure for this condition, but treatment with oral antiviral medicines can reduce symptoms, reduce the chance of spreading the virus to a partner, prevent future outbreaks, or shorten future outbreaks.  This information is not intended to replace advice given to you by your health care provider. Make sure you discuss any questions you have with your health care provider.  Document Released: 12/15/2001 Document Revised: 11/17/2017 Document Reviewed: 11/17/2017  [employer] Patient Education © 2018 [employer]

## 2018-08-27 ENCOUNTER — OFFICE VISIT (OUTPATIENT)
Dept: FAMILY MEDICINE CLINIC | Facility: CLINIC | Age: 23
End: 2018-08-27

## 2018-08-27 VITALS
DIASTOLIC BLOOD PRESSURE: 86 MMHG | WEIGHT: 154.2 LBS | OXYGEN SATURATION: 98 % | HEART RATE: 83 BPM | TEMPERATURE: 98.9 F | SYSTOLIC BLOOD PRESSURE: 130 MMHG | BODY MASS INDEX: 22.07 KG/M2 | HEIGHT: 70 IN

## 2018-08-27 DIAGNOSIS — A60.00 GENITAL HERPES SIMPLEX, UNSPECIFIED SITE: Primary | ICD-10-CM

## 2018-08-27 PROCEDURE — 99212 OFFICE O/P EST SF 10 MIN: CPT | Performed by: FAMILY MEDICINE

## 2018-08-27 NOTE — PROGRESS NOTES
Subjective   Jared Simpson is a 23 y.o. male.     One-week follow-up for genital herpes-was treated with Valtrex 1 g 3 times a day for a week.  Lesions have dried up.  No active lesions.  Patient said not had sexual contact 2 years. If  He has as many as 3 outbreaks in 6 months.  We will recommend maintenance therapy.        The following portions of the patient's history were reviewed and updated as appropriate: allergies, current medications, past family history, past medical history, past social history, past surgical history and problem list.    Review of Systems   Genitourinary:        Resolving lesions consistent with herpes       Objective   Physical Exam   Genitourinary:   Genitourinary Comments: Skin lesions have cleared       Assessment/Plan   Jared was seen today for follow-up.    Diagnoses and all orders for this visit:    Genital herpes simplex, unspecified site              plan monitor outbreaks over the next 6 months

## 2018-12-12 ENCOUNTER — OFFICE VISIT (OUTPATIENT)
Dept: FAMILY MEDICINE CLINIC | Facility: CLINIC | Age: 23
End: 2018-12-12

## 2018-12-12 VITALS
SYSTOLIC BLOOD PRESSURE: 124 MMHG | WEIGHT: 162.6 LBS | OXYGEN SATURATION: 98 % | HEIGHT: 70 IN | BODY MASS INDEX: 23.28 KG/M2 | HEART RATE: 80 BPM | TEMPERATURE: 98.6 F | DIASTOLIC BLOOD PRESSURE: 81 MMHG

## 2018-12-12 DIAGNOSIS — J01.00 ACUTE NON-RECURRENT MAXILLARY SINUSITIS: Primary | ICD-10-CM

## 2018-12-12 PROCEDURE — 96372 THER/PROPH/DIAG INJ SC/IM: CPT | Performed by: FAMILY MEDICINE

## 2018-12-12 PROCEDURE — 99213 OFFICE O/P EST LOW 20 MIN: CPT | Performed by: FAMILY MEDICINE

## 2018-12-12 RX ORDER — METHYLPREDNISOLONE ACETATE 40 MG/ML
40 INJECTION, SUSPENSION INTRA-ARTICULAR; INTRALESIONAL; INTRAMUSCULAR; SOFT TISSUE ONCE
Status: COMPLETED | OUTPATIENT
Start: 2018-12-12 | End: 2018-12-12

## 2018-12-12 RX ORDER — AZITHROMYCIN 250 MG/1
TABLET, FILM COATED ORAL
Qty: 6 TABLET | Refills: 0 | Status: SHIPPED | OUTPATIENT
Start: 2018-12-12 | End: 2019-01-08

## 2018-12-12 RX ADMIN — METHYLPREDNISOLONE ACETATE 40 MG: 40 INJECTION, SUSPENSION INTRA-ARTICULAR; INTRALESIONAL; INTRAMUSCULAR; SOFT TISSUE at 13:16

## 2018-12-12 NOTE — PROGRESS NOTES
Subjective   Jared Simpson is a 23 y.o. male.     Sinusitis   This is a new problem. The current episode started in the past 7 days. The problem has been gradually worsening since onset. There has been no fever. Associated symptoms include congestion, headaches, a hoarse voice and sinus pressure. Pertinent negatives include no chills. Past treatments include nothing.       The following portions of the patient's history were reviewed and updated as appropriate: allergies, current medications, past family history, past medical history, past social history, past surgical history and problem list.    Review of Systems   Constitutional: Negative for chills.   HENT: Positive for congestion, hoarse voice and sinus pressure.    Neurological: Positive for headaches.       Objective   Physical Exam   Constitutional: He is oriented to person, place, and time. He appears well-developed and well-nourished.   HENT:   Right Ear: External ear normal.   Left Ear: External ear normal.   Mouth/Throat: Oropharyngeal exudate present.   Cardiovascular: Normal rate and regular rhythm.   Pulmonary/Chest: Effort normal and breath sounds normal. He has no wheezes. He has no rales.   Neurological: He is alert and oriented to person, place, and time.   Psychiatric: He has a normal mood and affect. His behavior is normal. Judgment and thought content normal.   Nursing note and vitals reviewed.      Assessment/Plan   Jared was seen today for sinusitis.    Diagnoses and all orders for this visit:    Acute non-recurrent maxillary sinusitis  -     methylPREDNISolone acetate (DEPO-medrol) injection 40 mg; Inject 1 mL into the appropriate muscle as directed by prescriber 1 (One) Time.    Other orders  -     azithromycin (ZITHROMAX Z-MICHELLE) 250 MG tablet; Take 2 tablets the first day, then 1 tablet daily for 4 days.         Plan-above plus Flonase

## 2019-01-08 ENCOUNTER — OFFICE VISIT (OUTPATIENT)
Dept: FAMILY MEDICINE CLINIC | Facility: CLINIC | Age: 24
End: 2019-01-08

## 2019-01-08 VITALS
HEART RATE: 83 BPM | OXYGEN SATURATION: 98 % | WEIGHT: 163 LBS | HEIGHT: 70 IN | TEMPERATURE: 99.8 F | SYSTOLIC BLOOD PRESSURE: 90 MMHG | BODY MASS INDEX: 23.34 KG/M2 | DIASTOLIC BLOOD PRESSURE: 61 MMHG

## 2019-01-08 DIAGNOSIS — B00.9 HERPES: ICD-10-CM

## 2019-01-08 DIAGNOSIS — R21 RASH: Primary | ICD-10-CM

## 2019-01-08 PROCEDURE — 99213 OFFICE O/P EST LOW 20 MIN: CPT | Performed by: FAMILY MEDICINE

## 2019-01-08 RX ORDER — VALACYCLOVIR HYDROCHLORIDE 1 G/1
1000 TABLET, FILM COATED ORAL 3 TIMES DAILY
Qty: 21 TABLET | Refills: 0 | Status: SHIPPED | OUTPATIENT
Start: 2019-01-08 | End: 2019-07-15 | Stop reason: SDUPTHER

## 2019-01-08 NOTE — PROGRESS NOTES
Subjective   Jared Simpson is a 23 y.o. male.     23-year-old male presents with a vesicular rash penis left-has had herpes in the past-first outbreak in 6 months      Pain   This is a recurrent problem. The current episode started yesterday. The problem occurs constantly. The problem has been waxing and waning. Associated symptoms comments: Vesicular rash left penile shaft. Exacerbated by: Denies sex or sexual contact. He has tried nothing for the symptoms.       The following portions of the patient's history were reviewed and updated as appropriate: allergies, current medications, past family history, past medical history, past social history, past surgical history and problem list.    Review of Systems   Genitourinary: Positive for penile pain. Negative for scrotal swelling.        Vesicular penile rash       Objective   Physical Exam   Constitutional: He is oriented to person, place, and time. He appears well-developed and well-nourished.   Genitourinary:   Genitourinary Comments: Vesicular pain or rash left shaft   Neurological: He is alert and oriented to person, place, and time.   Skin: Rash noted.   Psychiatric: He has a normal mood and affect. His behavior is normal. Judgment and thought content normal.   Nursing note and vitals reviewed.      Assessment/Plan   Jared was seen today for herpes zoster.    Diagnoses and all orders for this visit:    Rash    Other orders  -     valACYclovir (VALTREX) 1000 MG tablet; Take 1 tablet by mouth 3 (Three) Times a Day.           Treatment above-first outbreak in 5 months-not having enough at this point to justify maintenance therapy

## 2019-01-12 LAB
HSV1 DNA SPEC QL NAA+PROBE: NEGATIVE
HSV2 DNA SPEC QL NAA+PROBE: POSITIVE
Lab: NORMAL

## 2019-04-12 ENCOUNTER — TELEPHONE (OUTPATIENT)
Dept: FAMILY MEDICINE CLINIC | Facility: CLINIC | Age: 24
End: 2019-04-12

## 2019-04-12 NOTE — TELEPHONE ENCOUNTER
PT HAS GONE OFF MOM INSURANCE-NOW HAS ANTH MEDICAID. DR CROUCH DOES NOT ACCEPT MEDICAID. OPTION WAS TO TRANSFER CARE TO ANOTHER MD OR HAVE PCP MONITOR MED FOR REFILLS. MOM WILL CONTACT DR CROUCH TO SEE IF MEDICAID WOULD APPROVE MD FOR REFILLS-AUTHORIZATION REQUIRED.  IF THIS DOES NOT HAPPEN-COULD YOU TAKE OVER THE MED REFILLS?    PLEASE ADVISE

## 2019-05-15 ENCOUNTER — TELEPHONE (OUTPATIENT)
Dept: FAMILY MEDICINE CLINIC | Facility: CLINIC | Age: 24
End: 2019-05-15

## 2019-05-15 NOTE — TELEPHONE ENCOUNTER
PLEASE CALL ANNA---PT'S MOTHER IS REQUESTING GENOMIND SWAB KIT. WILL CHECK WHAT MEDS BEST SUIT HIS GENETICS.

## 2019-05-16 NOTE — TELEPHONE ENCOUNTER
MOTHER STATES SHE HAS CHECKED WITH DR MUSE-DOES NOT TAKE INSURANCE.  WAS ADVISED FROM DR MUSE TO HAVE A PROVIDER WHO ACCEPTS PT INSURANCE. WILL BE SENDING INFO FOR YOU TO REVIEW.       PT'S MOTHER HAS FOUND A PROVIDER WHO WILL ORDER KIT.

## 2019-07-15 ENCOUNTER — OFFICE VISIT (OUTPATIENT)
Dept: FAMILY MEDICINE CLINIC | Facility: CLINIC | Age: 24
End: 2019-07-15

## 2019-07-15 VITALS
SYSTOLIC BLOOD PRESSURE: 128 MMHG | HEIGHT: 70 IN | DIASTOLIC BLOOD PRESSURE: 85 MMHG | BODY MASS INDEX: 22.85 KG/M2 | HEART RATE: 91 BPM | WEIGHT: 159.6 LBS | TEMPERATURE: 98.9 F | OXYGEN SATURATION: 98 %

## 2019-07-15 DIAGNOSIS — R21 RASH: Primary | ICD-10-CM

## 2019-07-15 PROCEDURE — 99213 OFFICE O/P EST LOW 20 MIN: CPT | Performed by: FAMILY MEDICINE

## 2019-07-15 RX ORDER — VALACYCLOVIR HYDROCHLORIDE 1 G/1
1000 TABLET, FILM COATED ORAL 3 TIMES DAILY
Qty: 21 TABLET | Refills: 0 | Status: SHIPPED | OUTPATIENT
Start: 2019-07-15 | End: 2021-12-28

## 2019-07-15 RX ORDER — LAMOTRIGINE 25 MG/1
25 TABLET ORAL DAILY
COMMUNITY
End: 2021-04-27 | Stop reason: ALTCHOICE

## 2019-07-15 NOTE — PROGRESS NOTES
Subjective   Jared Simpson is a 24 y.o. male.     24-year-old male with penile lesion consistent with herpes-2 episodes the last 11 months      Rash   This is a recurrent problem. The current episode started in the past 7 days. The problem has been gradually improving since onset. Location: Base of penis and right head of penis. The rash is characterized by blistering. He was exposed to nothing (Denies sexual contact). Past treatments include nothing.       The following portions of the patient's history were reviewed and updated as appropriate: allergies, current medications, past family history, past medical history, past social history, past surgical history and problem list.    Review of Systems   Skin: Positive for rash.       Objective   Physical Exam   Constitutional: He is oriented to person, place, and time. He appears well-developed and well-nourished.   Genitourinary:   Genitourinary Comments: Penile head - base of penis- rash-ulcerative after blistering component-1-week-old-consistent with herpetic lesion recurrent nature   Neurological: He is alert and oriented to person, place, and time.   Skin: Rash noted.   Psychiatric: He has a normal mood and affect. His behavior is normal. Judgment and thought content normal.   Nursing note and vitals reviewed.      Assessment/Plan   Jared was seen today for herpes zoster.    Diagnoses and all orders for this visit:    Rash    Other orders  -     valACYclovir (VALTREX) 1000 MG tablet; Take 1 tablet by mouth 3 (Three) Times a Day.         Plan second episode 1112 months plan if becomes more frequent i.e. 3 episodes in 6 months we will go on maintenance therapy for right now we will treat individual episodes

## 2019-08-07 ENCOUNTER — OFFICE VISIT (OUTPATIENT)
Dept: FAMILY MEDICINE CLINIC | Facility: CLINIC | Age: 24
End: 2019-08-07

## 2019-08-07 VITALS
DIASTOLIC BLOOD PRESSURE: 69 MMHG | SYSTOLIC BLOOD PRESSURE: 102 MMHG | WEIGHT: 160.2 LBS | TEMPERATURE: 98.2 F | BODY MASS INDEX: 22.94 KG/M2 | HEART RATE: 81 BPM | HEIGHT: 70 IN

## 2019-08-07 DIAGNOSIS — L23.7 ALLERGIC CONTACT DERMATITIS DUE TO PLANTS, EXCEPT FOOD: Primary | ICD-10-CM

## 2019-08-07 PROCEDURE — 96372 THER/PROPH/DIAG INJ SC/IM: CPT | Performed by: NURSE PRACTITIONER

## 2019-08-07 PROCEDURE — 99213 OFFICE O/P EST LOW 20 MIN: CPT | Performed by: NURSE PRACTITIONER

## 2019-08-07 RX ORDER — METHYLPREDNISOLONE ACETATE 40 MG/ML
40 INJECTION, SUSPENSION INTRA-ARTICULAR; INTRALESIONAL; INTRAMUSCULAR; SOFT TISSUE ONCE
Status: COMPLETED | OUTPATIENT
Start: 2019-08-07 | End: 2019-08-07

## 2019-08-07 RX ADMIN — METHYLPREDNISOLONE ACETATE 40 MG: 40 INJECTION, SUSPENSION INTRA-ARTICULAR; INTRALESIONAL; INTRAMUSCULAR; SOFT TISSUE at 10:18

## 2019-08-07 NOTE — PROGRESS NOTES
CC: Poison ivy    History:  Jared Simpson is a 24 y.o. male who presents today for evaluation of the above problems.      Rash   This is a new problem. The current episode started 1 to 4 weeks ago. The problem has been gradually worsening since onset. The affected locations include the right hand, right fingers, right lower leg, left hand, left fingers and left lower leg. The rash is characterized by itchiness and redness. He was exposed to plant contact. Pertinent negatives include no fever or shortness of breath. Past treatments include nothing.     ROS:  Review of Systems   Constitutional: Negative for fever.   Respiratory: Negative for shortness of breath.    Skin: Positive for rash.       No Known Allergies  Past Medical History:   Diagnosis Date   • ADHD (attention deficit hyperactivity disorder)    • Allergic    • Anxiety    • Arm injury     right   • Depression    • Headache    • Wrist injury      Past Surgical History:   Procedure Laterality Date   • COLONOSCOPY N/A 5/23/2017    Procedure: COLONOSCOPY WITH ANESTHESIA;  Surgeon: Janice Bradshaw MD;  Location: [facility] ENDOSCOPY;  Service:    • RHINOPLASTY     • TONSILLECTOMY       Family History   Problem Relation Age of Onset   • No Known Problems Mother    • No Known Problems Father    • No Known Problems Brother    • Brain cancer Maternal Grandmother    • Dementia Maternal Grandfather    • No Known Problems Paternal Grandmother    • Pancreatic cancer Paternal Grandfather       reports that he has been smoking cigarettes.  He has a 4.50 pack-year smoking history. He has never used smokeless tobacco. He reports that he drinks about 3.6 oz of alcohol per week. He reports that he uses drugs. Drug: Marijuana.      Current Outpatient Medications:   •  buPROPion XL (WELLBUTRIN XL) 150 MG 24 hr tablet, 300 mg Every Morning., Disp: , Rfl:   •  Cholecalciferol (VITAMIN D) 2000 UNITS tablet, Take 4,000 Units by mouth Daily. Patient takes 4000 units daily, Disp: , Rfl:  "  •  KRILL OIL PO, Take 1 tablet by mouth. TAKE 350MG DAILY., Disp: , Rfl:   •  lamoTRIgine (LAMICTAL) 25 MG tablet, Take 25 mg by mouth Daily., Disp: , Rfl:   •  lithium carbonate 150 MG capsule, Take 100 mg by mouth 2 (Two) Times a Day., Disp: , Rfl:   •  propranolol (INDERAL) 10 MG tablet, TAKE 1 TABLET BY MOUTH TWICE A DAY, Disp: , Rfl: 2  •  QUEtiapine (SEROquel) 100 MG tablet, TAKE 1 TABLET BY MOUTH AT BEDTIME, Disp: , Rfl: 2  •  risperiDONE (risperDAL) 1 MG tablet, TAKE 1/2 TABLET BY MOUTH AT BEDTIME, Disp: , Rfl: 2  •  topiramate (TOPAMAX) 100 MG tablet, Take 150 mg by mouth Daily., Disp: , Rfl:   •  valACYclovir (VALTREX) 1000 MG tablet, Take 1 tablet by mouth 3 (Three) Times a Day., Disp: 21 tablet, Rfl: 0    OBJECTIVE:  /69 (BP Location: Left arm, Patient Position: Sitting, Cuff Size: Adult)   Pulse 81   Temp 98.2 °F (36.8 °C)   Ht 177.8 cm (70\")   Wt 72.7 kg (160 lb 3.2 oz)   BMI 22.99 kg/m²    Physical Exam   Constitutional: He is oriented to person, place, and time. Vital signs are normal. He appears well-developed and well-nourished.   Cardiovascular: Normal rate.   Pulmonary/Chest: Effort normal.   Neurological: He is alert and oriented to person, place, and time.   Skin:   Diffuse erythematous rash on lower extremities from knees down as well as on top hands, between fingers, and minimally on palms.    Psychiatric: He has a normal mood and affect. His behavior is normal.   Vitals reviewed.      Assessment/Plan    Jared was seen today for poison ivy.    Diagnoses and all orders for this visit:    Allergic contact dermatitis due to plants, except food  -     methylPREDNISolone acetate (DEPO-medrol) injection 40 mg    Injection given by MA. Patient advised to stay in office for 15 minutes post injection, but patient refused, stating he had to get to work.     An After Visit Summary was printed and given to the patient at discharge.  Return if symptoms worsen or fail to improve.       Kisha" GENESIS Cristobal 08/07/2019    Electronically signed.

## 2021-03-26 ENCOUNTER — LAB (OUTPATIENT)
Dept: FAMILY MEDICINE CLINIC | Facility: CLINIC | Age: 26
End: 2021-03-26

## 2021-04-27 ENCOUNTER — OFFICE VISIT (OUTPATIENT)
Dept: FAMILY MEDICINE CLINIC | Facility: CLINIC | Age: 26
End: 2021-04-27

## 2021-04-27 VITALS
HEIGHT: 71 IN | TEMPERATURE: 98.7 F | SYSTOLIC BLOOD PRESSURE: 138 MMHG | DIASTOLIC BLOOD PRESSURE: 73 MMHG | RESPIRATION RATE: 16 BRPM | HEART RATE: 66 BPM | OXYGEN SATURATION: 97 % | BODY MASS INDEX: 24.36 KG/M2 | WEIGHT: 174 LBS

## 2021-04-27 DIAGNOSIS — R52 PAIN: Primary | ICD-10-CM

## 2021-04-27 PROCEDURE — 99213 OFFICE O/P EST LOW 20 MIN: CPT | Performed by: FAMILY MEDICINE

## 2021-04-27 RX ORDER — TRIAMCINOLONE ACETONIDE 1 MG/G
CREAM TOPICAL
Qty: 30 G | Refills: 5 | Status: SHIPPED | OUTPATIENT
Start: 2021-04-27 | End: 2021-10-04

## 2021-04-27 RX ORDER — FLUTICASONE PROPIONATE 50 MCG
2 SPRAY, SUSPENSION (ML) NASAL
COMMUNITY
End: 2021-12-28

## 2021-04-27 RX ORDER — HALOPERIDOL DECANOATE 50 MG/ML
50 INJECTION INTRAMUSCULAR WEEKLY
COMMUNITY
Start: 2021-04-15

## 2021-04-27 RX ORDER — BENZTROPINE MESYLATE 0.5 MG/1
0.5 TABLET ORAL
COMMUNITY
Start: 2021-03-22 | End: 2022-03-07

## 2021-04-27 NOTE — PROGRESS NOTES
Subjective   Jared Simpson is a 26 y.o. male.     26-year-old male with left  thumb metacarpal pain consistent with de Quervain's disease      The following portions of the patient's history were reviewed and updated as appropriate: allergies, current medications, past family history, past medical history, past social history, past surgical history and problem list.    Review of Systems   Musculoskeletal: Negative for joint swelling and myalgias.        Left thumb pain dorsal aspect first metacarpal   Psychiatric/Behavioral:        Bipolar type I, ADHD-managed by psychiatrist       Objective   Physical Exam  Vitals and nursing note reviewed.   Constitutional:       Appearance: Normal appearance. He is normal weight.   Musculoskeletal:         General: Swelling and tenderness present. No signs of injury.      Comments: Vitas time left consistent with de Quervain's disease   Neurological:      General: No focal deficit present.      Mental Status: He is alert and oriented to person, place, and time.   Psychiatric:         Mood and Affect: Mood normal.         Behavior: Behavior normal.         Assessment/Plan   Diagnoses and all orders for this visit:    1. Pain (Primary)    Other orders  -     triamcinolone (KENALOG) 0.1 % cream; Apply frequently to tendinitis of the left thumb  Dispense: 30 g; Refill: 5         Plan-above-explanation-talked about habits i.e. computer weed eating sleeping patterns etc. needs to correct to help

## 2021-08-15 NOTE — PROGRESS NOTES
Problem: At Risk for Falls  Goal: # Patient does not fall  Outcome: Outcome Not Met, Continue to Monitor  Goal: # Takes action to control fall-related risks  Outcome: Outcome Not Met, Continue to Monitor  Goal: # Verbalizes understanding of fall risk/precautions  Description: Document education using the patient education activity  Outcome: Outcome Not Met, Continue to Monitor      Administered 40 mg Methyiprednisolone to the right dorsogluteal. Pt tolerated well, observed no reactions.

## 2021-10-04 ENCOUNTER — OFFICE VISIT (OUTPATIENT)
Dept: FAMILY MEDICINE CLINIC | Facility: CLINIC | Age: 26
End: 2021-10-04

## 2021-10-04 VITALS
BODY MASS INDEX: 23.94 KG/M2 | OXYGEN SATURATION: 97 % | SYSTOLIC BLOOD PRESSURE: 114 MMHG | WEIGHT: 171 LBS | HEART RATE: 73 BPM | TEMPERATURE: 97.1 F | RESPIRATION RATE: 16 BRPM | HEIGHT: 71 IN | DIASTOLIC BLOOD PRESSURE: 70 MMHG

## 2021-10-04 DIAGNOSIS — L70.0 ACNE VULGARIS: ICD-10-CM

## 2021-10-04 DIAGNOSIS — Z23 NEED FOR INFLUENZA VACCINATION: Primary | ICD-10-CM

## 2021-10-04 PROCEDURE — 99213 OFFICE O/P EST LOW 20 MIN: CPT | Performed by: FAMILY MEDICINE

## 2021-10-04 PROCEDURE — 90471 IMMUNIZATION ADMIN: CPT | Performed by: FAMILY MEDICINE

## 2021-10-04 PROCEDURE — 90686 IIV4 VACC NO PRSV 0.5 ML IM: CPT | Performed by: FAMILY MEDICINE

## 2021-10-04 RX ORDER — LEVETIRACETAM 250 MG/1
500 TABLET ORAL 2 TIMES DAILY
COMMUNITY
Start: 2021-09-20 | End: 2022-03-07 | Stop reason: DRUGHIGH

## 2021-10-04 RX ORDER — DOXYCYCLINE HYCLATE 100 MG/1
100 CAPSULE ORAL DAILY
COMMUNITY
Start: 2021-09-02 | End: 2021-10-04 | Stop reason: SDUPTHER

## 2021-10-04 RX ORDER — LURASIDONE HYDROCHLORIDE 20 MG/1
20 TABLET, FILM COATED ORAL NIGHTLY
COMMUNITY
End: 2021-12-28

## 2021-10-04 RX ORDER — DOXYCYCLINE HYCLATE 100 MG/1
100 CAPSULE ORAL DAILY
Qty: 90 CAPSULE | Refills: 3 | Status: SHIPPED | OUTPATIENT
Start: 2021-10-04 | End: 2022-03-07

## 2021-10-04 NOTE — PROGRESS NOTES
Subjective   Jared Simpson is a 26 y.o. male.     26-year-old male with acne-especially back      The following portions of the patient's history were reviewed and updated as appropriate: allergies, current medications, past family history, past medical history, past social history, past surgical history and problem list.    Review of Systems   Skin: Positive for rash.   Psychiatric/Behavioral:        See psychiatrist for disorder       Objective   Physical Exam  Vitals and nursing note reviewed.   Cardiovascular:      Rate and Rhythm: Normal rate and regular rhythm.      Pulses: Normal pulses.      Heart sounds: Normal heart sounds.   Pulmonary:      Effort: Pulmonary effort is normal.      Breath sounds: Normal breath sounds.   Skin:     General: Skin is warm and dry.      Comments: Diffuse acne back         Assessment/Plan   Diagnoses and all orders for this visit:    1. Need for influenza vaccination (Primary)  -     FluLaval/Fluarix (VFC) >6 Months    2. Acne vulgaris    Other orders  -     doxycycline (VIBRAMYCIN) 100 MG capsule; Take 1 capsule by mouth Daily.  Dispense: 90 capsule; Refill: 3         Plan get air barrier

## 2021-10-20 ENCOUNTER — LAB (OUTPATIENT)
Dept: FAMILY MEDICINE CLINIC | Facility: CLINIC | Age: 26
End: 2021-10-20

## 2021-12-28 ENCOUNTER — OFFICE VISIT (OUTPATIENT)
Dept: FAMILY MEDICINE CLINIC | Facility: CLINIC | Age: 26
End: 2021-12-28

## 2021-12-28 VITALS
SYSTOLIC BLOOD PRESSURE: 109 MMHG | WEIGHT: 176.6 LBS | DIASTOLIC BLOOD PRESSURE: 77 MMHG | OXYGEN SATURATION: 99 % | HEART RATE: 87 BPM | TEMPERATURE: 97.8 F | HEIGHT: 71 IN | BODY MASS INDEX: 24.72 KG/M2

## 2021-12-28 DIAGNOSIS — R51.9 DAILY HEADACHE: ICD-10-CM

## 2021-12-28 DIAGNOSIS — G43.009 MIGRAINE WITHOUT AURA AND WITHOUT STATUS MIGRAINOSUS, NOT INTRACTABLE: Primary | ICD-10-CM

## 2021-12-28 DIAGNOSIS — R05.9 COUGH: ICD-10-CM

## 2021-12-28 DIAGNOSIS — R09.81 NASAL CONGESTION: ICD-10-CM

## 2021-12-28 PROCEDURE — 99214 OFFICE O/P EST MOD 30 MIN: CPT | Performed by: NURSE PRACTITIONER

## 2021-12-28 RX ORDER — FLUOXETINE HYDROCHLORIDE 20 MG/1
20 CAPSULE ORAL EVERY MORNING
COMMUNITY
Start: 2021-11-24

## 2021-12-28 RX ORDER — SUMATRIPTAN 100 MG/1
TABLET, FILM COATED ORAL
Qty: 9 TABLET | Refills: 2 | Status: SHIPPED | OUTPATIENT
Start: 2021-12-28 | End: 2022-10-26 | Stop reason: SDUPTHER

## 2021-12-28 RX ORDER — METAXALONE 800 MG/1
TABLET ORAL
COMMUNITY
Start: 2021-11-24 | End: 2023-03-09 | Stop reason: SDUPTHER

## 2021-12-28 RX ORDER — PROPRANOLOL HYDROCHLORIDE 10 MG/1
10 TABLET ORAL 3 TIMES DAILY
COMMUNITY
Start: 2021-11-24

## 2021-12-28 RX ORDER — ARIPIPRAZOLE 5 MG/1
TABLET ORAL
COMMUNITY
Start: 2021-11-23 | End: 2022-05-23

## 2021-12-28 RX ORDER — LITHIUM CARBONATE 300 MG/1
300 TABLET, FILM COATED, EXTENDED RELEASE ORAL 2 TIMES DAILY
COMMUNITY
Start: 2021-12-13 | End: 2022-10-26

## 2021-12-28 NOTE — PROGRESS NOTES
"Chief Complaint  Facial Pain (x1-1/2 week) and Nasal Congestion    Subjective          Jared Simpson presents to [facility]  History of Present Illness  DAILY HEADACHE:  Happens every day around 2pm, hurts behind the left eye, left frontal region of the head and down into the face.  He will take ibuprofen, lie down and it usually stops after about 3 hours or so.  Self - limiting.  No sensitivity to light or noise.  No true aura.    Objective   Vital Signs:   /77 (BP Location: Left arm, Patient Position: Sitting, Cuff Size: Large Adult)   Pulse 87   Temp 97.8 °F (36.6 °C)   Ht 180.3 cm (71\") Comment: pt reported  Wt 80.1 kg (176 lb 9.6 oz)   SpO2 99%   BMI 24.63 kg/m²     Physical Exam  Vitals and nursing note reviewed.   Constitutional:       General: He is not in acute distress.     Appearance: Normal appearance. He is normal weight. He is not ill-appearing.   HENT:      Head: Normocephalic and atraumatic.      Comments: No sinus tenderness.     Right Ear: Tympanic membrane and ear canal normal.      Left Ear: Tympanic membrane and ear canal normal.      Nose: Congestion present.      Mouth/Throat:      Mouth: Mucous membranes are moist.      Pharynx: Oropharynx is clear.   Cardiovascular:      Rate and Rhythm: Normal rate and regular rhythm.      Heart sounds: Normal heart sounds.   Pulmonary:      Effort: Pulmonary effort is normal.      Breath sounds: Normal breath sounds.   Skin:     General: Skin is warm and dry.   Neurological:      Mental Status: He is alert and oriented to person, place, and time.   Psychiatric:         Thought Content: Thought content normal.        Result Review :                 Assessment and Plan    Diagnoses and all orders for this visit:    1. Migraine without aura and without status migrainosus, not intractable (Primary)  -     SUMAtriptan (Imitrex) 100 MG tablet; Take one tablet at onset of headache. May repeat dose one time in 2 hours " if headache not relieved.  Dispense: 9 tablet; Refill: 2    2. Daily headache  -     QUESTIONNAIRE SERIES    3. Nasal congestion  -     COVID-19,[facility] ROUTINE, NP/OP SWAB IN TRANSPORT MEDIA OR ESWAB 72 HR TAT - Swab, Oropharynx  -     QUESTIONNAIRE SERIES    4. Cough  -     COVID-19,[facility] ROUTINE, NP/OP SWAB IN TRANSPORT MEDIA OR ESWAB 72 HR TAT - Swab, Oropharynx  -     QUESTIONNAIRE SERIES        Follow Up   Return if symptoms worsen or fail to improve.  Patient was given instructions and counseling regarding his condition or for health maintenance advice. Please see specific information pulled into the AVS if appropriate.

## 2021-12-29 LAB
LABCORP SARS-COV-2, NAA 2 DAY TAT: NORMAL
SARS-COV-2 RNA RESP QL NAA+PROBE: NOT DETECTED

## 2022-03-07 ENCOUNTER — OFFICE VISIT (OUTPATIENT)
Dept: FAMILY MEDICINE CLINIC | Facility: CLINIC | Age: 27
End: 2022-03-07

## 2022-03-07 VITALS
SYSTOLIC BLOOD PRESSURE: 108 MMHG | HEIGHT: 71 IN | DIASTOLIC BLOOD PRESSURE: 70 MMHG | BODY MASS INDEX: 25.48 KG/M2 | HEART RATE: 80 BPM | WEIGHT: 182 LBS | RESPIRATION RATE: 16 BRPM | TEMPERATURE: 98.2 F | OXYGEN SATURATION: 97 %

## 2022-03-07 DIAGNOSIS — R52 PAIN: Primary | ICD-10-CM

## 2022-03-07 PROCEDURE — 99212 OFFICE O/P EST SF 10 MIN: CPT | Performed by: FAMILY MEDICINE

## 2022-03-07 RX ORDER — LEVETIRACETAM 500 MG/1
250 TABLET ORAL 2 TIMES DAILY
COMMUNITY
Start: 2022-02-25 | End: 2022-11-02 | Stop reason: DRUGHIGH

## 2022-03-07 NOTE — PROGRESS NOTES
"Jared Mustafan    1995    Chief Complaint   Patient presents with   • Numbness     3 digits on the left hand.  3rd, 4th and 5th fingers x2 weeks       Vitals:    03/07/22 1536   BP: 108/70   BP Location: Left arm   Patient Position: Sitting   Cuff Size: Adult   Pulse: 80   Resp: 16   Temp: 98.2 °F (36.8 °C)   TempSrc: Temporal   SpO2: 97%   Weight: 82.6 kg (182 lb)   Height: 180.3 cm (71\")   PainSc: 0-No pain       26-year-old male with numbness left fifth and fourth digit      Review of Systems   Cardiovascular: Negative for chest pain.   Musculoskeletal: Negative for neck pain.   Neurological:        Ulnar neuropathy       Past Medical History:   Diagnosis Date   • ADHD (attention deficit hyperactivity disorder)    • Allergic    • Anxiety    • Arm injury     right   • Depression    • Headache    • Wrist injury          Current Outpatient Medications:   •  ARIPiprazole (ABILIFY) 5 MG tablet, TAKE 1/2 (ONE-HALF) TABLET BY MOUTH AT BEDTIME, Disp: , Rfl:   •  Cholecalciferol (VITAMIN D) 2000 UNITS tablet, Take 4,000 Units by mouth Daily. Patient takes 4000 units daily, Disp: , Rfl:   •  cyanocobalamin (VITAMIN B-12) 1000 MCG tablet, Take 1,000 mcg by mouth., Disp: , Rfl:   •  FLUoxetine (PROzac) 20 MG capsule, Take 20 mg by mouth Every Morning., Disp: , Rfl:   •  haloperidol decanoate (HALDOL DECANOATE) 50 MG/ML injection, 25 mg Every 14 (Fourteen) Days., Disp: , Rfl:   •  KRILL OIL PO, Take 1 tablet by mouth. TAKE 350MG DAILY., Disp: , Rfl:   •  levETIRAcetam (KEPPRA) 500 MG tablet, Take 500 mg by mouth 2 (Two) Times a Day., Disp: , Rfl:   •  lithium (LITHOBID) 300 MG CR tablet, Take 300 mg by mouth 2 (Two) Times a Day., Disp: , Rfl:   •  metaxalone (SKELAXIN) 800 MG tablet, TAKE 1/2 TO 1 (ONE-HALF TO ONE) TABLET BY MOUTH THREE TIMES DAILY AS NEEDED, Disp: , Rfl:   •  propranolol (INDERAL) 10 MG tablet, Take 10 mg by mouth 3 (Three) Times a Day., Disp: , Rfl:   •  SUMAtriptan (Imitrex) 100 MG tablet, Take one " tablet at onset of headache. May repeat dose one time in 2 hours if headache not relieved., Disp: 9 tablet, Rfl: 2    No Known Allergies    Patient Active Problem List   Diagnosis   • Bipolar 1 disorder (HCC)   • Diarrhea   • Abdominal cramping   • Family history of colon cancer       Social History     Socioeconomic History   • Marital status: Single   Tobacco Use   • Smoking status: Current Every Day Smoker     Packs/day: 0.75     Years: 6.00     Pack years: 4.50     Types: Cigarettes   • Smokeless tobacco: Never Used   Substance and Sexual Activity   • Alcohol use: Not Currently     Alcohol/week: 6.0 standard drinks     Types: 6 Cans of beer per week     Comment: oc.   • Drug use: Not Currently     Types: Marijuana     Comment: former   • Sexual activity: Defer       Past Surgical History:   Procedure Laterality Date   • COLONOSCOPY N/A 5/23/2017    Procedure: COLONOSCOPY WITH ANESTHESIA;  Surgeon: Janice Bradshaw MD;  Location: [facility] ENDOSCOPY;  Service:    • RHINOPLASTY     • TONSILLECTOMY         Physical Exam  Vitals and nursing note reviewed.   Constitutional:       Appearance: Normal appearance.   Eyes:      Extraocular Movements: Extraocular movements intact.      Pupils: Pupils are equal, round, and reactive to light.   Pulmonary:      Effort: Pulmonary effort is normal.   Musculoskeletal:         General: Tenderness present. No swelling, deformity or signs of injury.      Comments: Left elbow ulnar region-fifth and one half of ring finger numb and tingly   Skin:     General: Skin is warm and dry.   Neurological:      General: No focal deficit present.      Mental Status: He is alert and oriented to person, place, and time.   Psychiatric:         Mood and Affect: Mood normal.         Behavior: Behavior normal.         Thought Content: Thought content normal.         Judgment: Judgment normal.         Vitals:    03/07/22 1536   BP: 108/70   BP Location: Left arm   Patient Position: Sitting   Cuff Size:  "Adult   Pulse: 80   Resp: 16   Temp: 98.2 °F (36.8 °C)   TempSrc: Temporal   SpO2: 97%   Weight: 82.6 kg (182 lb)   Height: 180.3 cm (71\")     Patient's Body mass index is 25.38 kg/m². indicating that he is within normal range (BMI 18.5-24.9). No BMI management plan needed..      Diagnoses and all orders for this visit:    1. Pain (Primary)        Problems Addressed this Visit    None     Visit Diagnoses     Pain    -  Primary      Diagnoses       Codes Comments    Pain    -  Primary ICD-10-CM: R52  ICD-9-CM: 780.96           Health Maintenance:  Immunization History   Administered Date(s) Administered   • COVID-19 (MODERNA) 1st, 2nd, 3rd Dose Only 04/01/2021, 04/29/2021, 12/20/2021   • Flu Vaccine Quad PF >18YRS 10/05/2017, 10/31/2018   • FluLaval/Fluarix/Fluzone >6 10/05/2017, 10/31/2018, 12/01/2019, 10/04/2021                  Ulnar neuropathy-posture-sleeping and computer work                Electronically signed by Morris Espitia MD 03/07/2022  "

## 2022-04-21 ENCOUNTER — TELEMEDICINE (OUTPATIENT)
Dept: FAMILY MEDICINE CLINIC | Facility: CLINIC | Age: 27
End: 2022-04-21

## 2022-04-21 ENCOUNTER — CLINICAL SUPPORT (OUTPATIENT)
Dept: FAMILY MEDICINE CLINIC | Facility: CLINIC | Age: 27
End: 2022-04-21

## 2022-04-21 VITALS — WEIGHT: 182 LBS | BODY MASS INDEX: 25.48 KG/M2 | HEIGHT: 71 IN

## 2022-04-21 DIAGNOSIS — J01.00 ACUTE NON-RECURRENT MAXILLARY SINUSITIS: Primary | ICD-10-CM

## 2022-04-21 PROCEDURE — 99211 OFF/OP EST MAY X REQ PHY/QHP: CPT | Performed by: FAMILY MEDICINE

## 2022-04-21 PROCEDURE — 99213 OFFICE O/P EST LOW 20 MIN: CPT | Performed by: NURSE PRACTITIONER

## 2022-04-21 RX ORDER — AMOXICILLIN AND CLAVULANATE POTASSIUM 875; 125 MG/1; MG/1
1 TABLET, FILM COATED ORAL 2 TIMES DAILY
Qty: 20 TABLET | Refills: 0 | Status: SHIPPED | OUTPATIENT
Start: 2022-04-21 | End: 2022-05-23

## 2022-04-21 RX ORDER — GUAIFENESIN 600 MG/1
1200 TABLET, EXTENDED RELEASE ORAL 2 TIMES DAILY
Qty: 28 TABLET | Refills: 0 | Status: SHIPPED | OUTPATIENT
Start: 2022-04-21 | End: 2022-05-23

## 2022-04-21 RX ORDER — PREDNISONE 10 MG/1
TABLET ORAL
Qty: 21 TABLET | Refills: 0 | Status: SHIPPED | OUTPATIENT
Start: 2022-04-21 | End: 2022-05-23

## 2022-04-21 NOTE — PROGRESS NOTES
CC: cough and congestion    History:  Jared Simpson is a 27 y.o. male who presents today for evaluation of the above problems.      Congestion runny nose cough and mucous. Started 2 days ago.   No fever that he is aware of. Temp is 97.8 currently.  Has not been taking anything at home.     HPI  ROS:  Review of Systems   Constitutional: Negative for fever.   HENT: Positive for congestion, postnasal drip, rhinorrhea and sore throat.    Respiratory: Positive for cough.    Gastrointestinal: Negative for abdominal pain and diarrhea.   Neurological: Positive for headaches. Negative for dizziness and light-headedness.       No Known Allergies  Past Medical History:   Diagnosis Date   • ADHD (attention deficit hyperactivity disorder)    • Allergic    • Anxiety    • Arm injury     right   • Depression    • Headache    • Wrist injury      Past Surgical History:   Procedure Laterality Date   • COLONOSCOPY N/A 5/23/2017    Procedure: COLONOSCOPY WITH ANESTHESIA;  Surgeon: Janice Bradshaw MD;  Location: [facility] ENDOSCOPY;  Service:    • RHINOPLASTY     • TONSILLECTOMY       Family History   Problem Relation Age of Onset   • No Known Problems Mother    • No Known Problems Father    • No Known Problems Brother    • Brain cancer Maternal Grandmother    • Dementia Maternal Grandfather    • No Known Problems Paternal Grandmother    • Pancreatic cancer Paternal Grandfather       reports that he has been smoking cigarettes. He has a 4.50 pack-year smoking history. He has never used smokeless tobacco. He reports previous alcohol use of about 6.0 standard drinks of alcohol per week. He reports previous drug use. Drug: Marijuana.      Current Outpatient Medications:   •  ARIPiprazole (ABILIFY) 5 MG tablet, TAKE 1/2 (ONE-HALF) TABLET BY MOUTH AT BEDTIME, Disp: , Rfl:   •  Cholecalciferol (VITAMIN D) 2000 UNITS tablet, Take 4,000 Units by mouth Daily. Patient takes 4000 units daily, Disp: , Rfl:   •  cyanocobalamin (VITAMIN B-12) 1000 MCG  "tablet, Take 1,000 mcg by mouth., Disp: , Rfl:   •  FLUoxetine (PROzac) 20 MG capsule, Take 20 mg by mouth Every Morning., Disp: , Rfl:   •  haloperidol decanoate (HALDOL DECANOATE) 50 MG/ML injection, 25 mg Every 14 (Fourteen) Days., Disp: , Rfl:   •  KRILL OIL PO, Take 1 tablet by mouth. TAKE 350MG DAILY., Disp: , Rfl:   •  levETIRAcetam (KEPPRA) 500 MG tablet, Take 500 mg by mouth 2 (Two) Times a Day., Disp: , Rfl:   •  lithium (LITHOBID) 300 MG CR tablet, Take 300 mg by mouth 2 (Two) Times a Day., Disp: , Rfl:   •  metaxalone (SKELAXIN) 800 MG tablet, TAKE 1/2 TO 1 (ONE-HALF TO ONE) TABLET BY MOUTH THREE TIMES DAILY AS NEEDED, Disp: , Rfl:   •  propranolol (INDERAL) 10 MG tablet, Take 10 mg by mouth 3 (Three) Times a Day., Disp: , Rfl:   •  SUMAtriptan (Imitrex) 100 MG tablet, Take one tablet at onset of headache. May repeat dose one time in 2 hours if headache not relieved., Disp: 9 tablet, Rfl: 2  •  amoxicillin-clavulanate (Augmentin) 875-125 MG per tablet, Take 1 tablet by mouth 2 (Two) Times a Day., Disp: 20 tablet, Rfl: 0  •  guaiFENesin (Mucinex) 600 MG 12 hr tablet, Take 2 tablets by mouth 2 (Two) Times a Day., Disp: 28 tablet, Rfl: 0  •  predniSONE (DELTASONE) 10 MG (21) dose pack, Use as directed on package, Disp: 21 tablet, Rfl: 0    OBJECTIVE:  Ht 180.3 cm (71\")   Wt 82.6 kg (182 lb)   BMI 25.38 kg/m²    Physical Exam  Constitutional:       Appearance: He is well-developed.   Pulmonary:      Effort: Pulmonary effort is normal.   Neurological:      Mental Status: He is alert and oriented to person, place, and time.   Psychiatric:         Behavior: Behavior normal.         Assessment/Plan    Diagnoses and all orders for this visit:    1. Acute non-recurrent maxillary sinusitis (Primary)  -     guaiFENesin (Mucinex) 600 MG 12 hr tablet; Take 2 tablets by mouth 2 (Two) Times a Day.  Dispense: 28 tablet; Refill: 0  -     predniSONE (DELTASONE) 10 MG (21) dose pack; Use as directed on package  Dispense: " 21 tablet; Refill: 0  -     amoxicillin-clavulanate (Augmentin) 875-125 MG per tablet; Take 1 tablet by mouth 2 (Two) Times a Day.  Dispense: 20 tablet; Refill: 0    Start mucinex and prednisone today. If no improvement by Saturday start Augmentin.     This visit was completed via secure Zoom connection.       An After Visit Summary was printed and given to the patient at discharge.  Return if symptoms worsen or fail to improve, for Next scheduled follow up.       GENESIS Álvarez 4/21/22    Electronically signed.

## 2022-04-22 LAB
LABCORP SARS-COV-2, NAA 2 DAY TAT: NORMAL
SARS-COV-2 RNA RESP QL NAA+PROBE: NOT DETECTED

## 2022-05-23 ENCOUNTER — TELEMEDICINE (OUTPATIENT)
Dept: FAMILY MEDICINE CLINIC | Facility: CLINIC | Age: 27
End: 2022-05-23

## 2022-05-23 VITALS — HEIGHT: 71 IN | WEIGHT: 180 LBS | BODY MASS INDEX: 25.2 KG/M2

## 2022-05-23 DIAGNOSIS — L23.9 ALLERGIC DERMATITIS: Primary | ICD-10-CM

## 2022-05-23 PROCEDURE — 99213 OFFICE O/P EST LOW 20 MIN: CPT | Performed by: NURSE PRACTITIONER

## 2022-05-23 RX ORDER — LURASIDONE HYDROCHLORIDE 20 MG/1
20 TABLET, FILM COATED ORAL DAILY
COMMUNITY

## 2022-05-23 NOTE — PROGRESS NOTES
CC: rash on hand    History:  Jared Simpson is a 27 y.o. male who presents today for evaluation of the above problems.      Patient handled a dead kid goat about an hour ago and immediately developed a rash on the top of his right hand.  He is concerned for orf virus which he discovered on google as being carried by young goats.     HPI  ROS:  Review of Systems   Skin: Positive for rash.       No Known Allergies  Past Medical History:   Diagnosis Date   • ADHD (attention deficit hyperactivity disorder)    • Allergic    • Anxiety    • Arm injury     right   • Depression    • Headache    • Wrist injury      Past Surgical History:   Procedure Laterality Date   • COLONOSCOPY N/A 5/23/2017    Procedure: COLONOSCOPY WITH ANESTHESIA;  Surgeon: Janice Bradshaw MD;  Location: [facility] ENDOSCOPY;  Service:    • RHINOPLASTY     • TONSILLECTOMY       Family History   Problem Relation Age of Onset   • No Known Problems Mother    • No Known Problems Father    • No Known Problems Brother    • Brain cancer Maternal Grandmother    • Dementia Maternal Grandfather    • No Known Problems Paternal Grandmother    • Pancreatic cancer Paternal Grandfather       reports that he has been smoking cigarettes. He has a 4.50 pack-year smoking history. He has never used smokeless tobacco. He reports previous alcohol use of about 6.0 standard drinks of alcohol per week. He reports previous drug use. Drug: Marijuana.      Current Outpatient Medications:   •  Cholecalciferol (VITAMIN D) 2000 UNITS tablet, Take 4,000 Units by mouth Daily. Patient takes 4000 units daily, Disp: , Rfl:   •  cyanocobalamin (VITAMIN B-12) 1000 MCG tablet, Take 1,000 mcg by mouth., Disp: , Rfl:   •  FLUoxetine (PROzac) 20 MG capsule, Take 20 mg by mouth Every Morning., Disp: , Rfl:   •  haloperidol decanoate (HALDOL DECANOATE) 50 MG/ML injection, 25 mg Every 14 (Fourteen) Days., Disp: , Rfl:   •  KRILL OIL PO, Take 1 tablet by mouth. TAKE 350MG DAILY., Disp: , Rfl:   •   "levETIRAcetam (KEPPRA) 500 MG tablet, Take 500 mg by mouth 2 (Two) Times a Day., Disp: , Rfl:   •  lithium (LITHOBID) 300 MG CR tablet, Take 300 mg by mouth 2 (Two) Times a Day., Disp: , Rfl:   •  Lurasidone HCl (Latuda) 20 MG tablet tablet, Take 20 mg by mouth Daily., Disp: , Rfl:   •  metaxalone (SKELAXIN) 800 MG tablet, TAKE 1/2 TO 1 (ONE-HALF TO ONE) TABLET BY MOUTH THREE TIMES DAILY AS NEEDED, Disp: , Rfl:   •  propranolol (INDERAL) 10 MG tablet, Take 10 mg by mouth 3 (Three) Times a Day., Disp: , Rfl:   •  SUMAtriptan (Imitrex) 100 MG tablet, Take one tablet at onset of headache. May repeat dose one time in 2 hours if headache not relieved., Disp: 9 tablet, Rfl: 2    OBJECTIVE:  Ht 180.3 cm (71\") Comment: pt reported  Wt 81.6 kg (180 lb) Comment: pt reported  BMI 25.10 kg/m²    Physical Exam  Vitals reviewed.   Constitutional:       Appearance: He is well-developed.   Cardiovascular:      Rate and Rhythm: Normal rate.   Pulmonary:      Effort: Pulmonary effort is normal.   Skin:     Comments: Multiple very small bumps and erythema on top of hand.    Neurological:      Mental Status: He is alert and oriented to person, place, and time.   Psychiatric:         Behavior: Behavior normal.         Assessment/Plan    Diagnoses and all orders for this visit:    1. Allergic dermatitis (Primary)    Patient advised to go and shower to remove any additional potential allergen.   While I was not familiar with the orf virus, I did research it while on the encounter with patient and this does not seem likely based on presentation.   Advised to use hydrocortisone cream and advise us should the condition worsen.         This visit was completed via secure Zoom connection.       An After Visit Summary was printed and given to the patient at discharge.  Return if symptoms worsen or fail to improve, for Next scheduled follow up.       Kisha Cristobal, APRN 5/23/22    Electronically signed.  "

## 2022-07-18 ENCOUNTER — TELEMEDICINE (OUTPATIENT)
Dept: FAMILY MEDICINE CLINIC | Facility: CLINIC | Age: 27
End: 2022-07-18

## 2022-07-18 ENCOUNTER — CLINICAL SUPPORT (OUTPATIENT)
Dept: FAMILY MEDICINE CLINIC | Facility: CLINIC | Age: 27
End: 2022-07-18

## 2022-07-18 DIAGNOSIS — U07.1 COVID: Primary | ICD-10-CM

## 2022-07-18 LAB
EXPIRATION DATE: ABNORMAL
FLUAV AG UPPER RESP QL IA.RAPID: NOT DETECTED
FLUBV AG UPPER RESP QL IA.RAPID: NOT DETECTED
INTERNAL CONTROL: ABNORMAL
Lab: ABNORMAL
SARS-COV-2 AG UPPER RESP QL IA.RAPID: DETECTED

## 2022-07-18 PROCEDURE — 99213 OFFICE O/P EST LOW 20 MIN: CPT | Performed by: NURSE PRACTITIONER

## 2022-07-18 PROCEDURE — 87428 SARSCOV & INF VIR A&B AG IA: CPT | Performed by: NURSE PRACTITIONER

## 2022-07-18 RX ORDER — AZITHROMYCIN 250 MG/1
TABLET, FILM COATED ORAL
Qty: 6 TABLET | Refills: 0 | Status: SHIPPED | OUTPATIENT
Start: 2022-07-18 | End: 2022-10-26

## 2022-07-18 RX ORDER — GUAIFENESIN 600 MG/1
1200 TABLET, EXTENDED RELEASE ORAL 2 TIMES DAILY
Qty: 28 TABLET | Refills: 0 | Status: SHIPPED | OUTPATIENT
Start: 2022-07-18 | End: 2023-01-16

## 2022-07-18 RX ORDER — PREDNISONE 10 MG/1
TABLET ORAL
Qty: 21 TABLET | Refills: 0 | Status: SHIPPED | OUTPATIENT
Start: 2022-07-18 | End: 2022-10-26

## 2022-07-18 NOTE — PROGRESS NOTES
Patient had telehealth appt and presented for COVID test.  Patient called upon arrival to office and remained in vehicle.  I wore N95, personal glasses, face shield, gown and gloves.

## 2022-07-18 NOTE — PROGRESS NOTES
CC: Symptomatic COVID exposure    History:  Jared Simpson is a 27 y.o. male who presents today for evaluation of the above problems.      Symptoms started Thursday. Mom and Dad both have COVID. Complains of chills and fatigue, sore throat, cough, and headache.     HPI  ROS:  Review of Systems   Constitutional: Positive for chills and fatigue.   HENT: Positive for sore throat. Negative for congestion.    Respiratory: Positive for cough.    Neurological: Positive for headaches.       No Known Allergies  Past Medical History:   Diagnosis Date   • ADHD (attention deficit hyperactivity disorder)    • Allergic    • Anxiety    • Arm injury     right   • Depression    • Headache    • Wrist injury      Past Surgical History:   Procedure Laterality Date   • COLONOSCOPY N/A 5/23/2017    Procedure: COLONOSCOPY WITH ANESTHESIA;  Surgeon: Janice Bradshaw MD;  Location: [facility] ENDOSCOPY;  Service:    • RHINOPLASTY     • TONSILLECTOMY       Family History   Problem Relation Age of Onset   • No Known Problems Mother    • No Known Problems Father    • No Known Problems Brother    • Brain cancer Maternal Grandmother    • Dementia Maternal Grandfather    • No Known Problems Paternal Grandmother    • Pancreatic cancer Paternal Grandfather       reports that he has been smoking cigarettes. He has a 4.50 pack-year smoking history. He has never used smokeless tobacco. He reports previous alcohol use of about 6.0 standard drinks of alcohol per week. He reports previous drug use. Drug: Marijuana.      Current Outpatient Medications:   •  azithromycin (Zithromax) 250 MG tablet, Take 2 tablets the first day, then 1 tablet daily for 4 days., Disp: 6 tablet, Rfl: 0  •  Cholecalciferol (VITAMIN D) 2000 UNITS tablet, Take 4,000 Units by mouth Daily. Patient takes 4000 units daily, Disp: , Rfl:   •  cyanocobalamin (VITAMIN B-12) 1000 MCG tablet, Take 1,000 mcg by mouth., Disp: , Rfl:   •  FLUoxetine (PROzac) 20 MG capsule, Take 20 mg by mouth Every  Morning., Disp: , Rfl:   •  guaiFENesin (Mucinex) 600 MG 12 hr tablet, Take 2 tablets by mouth 2 (Two) Times a Day., Disp: 28 tablet, Rfl: 0  •  haloperidol decanoate (HALDOL DECANOATE) 50 MG/ML injection, 25 mg Every 14 (Fourteen) Days., Disp: , Rfl:   •  KRILL OIL PO, Take 1 tablet by mouth. TAKE 350MG DAILY., Disp: , Rfl:   •  levETIRAcetam (KEPPRA) 500 MG tablet, Take 500 mg by mouth 2 (Two) Times a Day., Disp: , Rfl:   •  lithium (LITHOBID) 300 MG CR tablet, Take 300 mg by mouth 2 (Two) Times a Day., Disp: , Rfl:   •  Lurasidone HCl (Latuda) 20 MG tablet tablet, Take 20 mg by mouth Daily., Disp: , Rfl:   •  metaxalone (SKELAXIN) 800 MG tablet, TAKE 1/2 TO 1 (ONE-HALF TO ONE) TABLET BY MOUTH THREE TIMES DAILY AS NEEDED, Disp: , Rfl:   •  predniSONE (DELTASONE) 10 MG (21) dose pack, Use as directed on package, Disp: 21 tablet, Rfl: 0  •  propranolol (INDERAL) 10 MG tablet, Take 10 mg by mouth 3 (Three) Times a Day., Disp: , Rfl:   •  SUMAtriptan (Imitrex) 100 MG tablet, Take one tablet at onset of headache. May repeat dose one time in 2 hours if headache not relieved., Disp: 9 tablet, Rfl: 2    OBJECTIVE:  There were no vitals taken for this visit.   Physical Exam  Constitutional:       Appearance: He is well-developed. He is ill-appearing.   Pulmonary:      Effort: Pulmonary effort is normal.   Neurological:      Mental Status: He is alert and oriented to person, place, and time.   Psychiatric:         Behavior: Behavior normal.         Assessment/Plan    Diagnoses and all orders for this visit:    1. COVID (Primary)  -     azithromycin (Zithromax) 250 MG tablet; Take 2 tablets the first day, then 1 tablet daily for 4 days.  Dispense: 6 tablet; Refill: 0  -     predniSONE (DELTASONE) 10 MG (21) dose pack; Use as directed on package  Dispense: 21 tablet; Refill: 0  -     guaiFENesin (Mucinex) 600 MG 12 hr tablet; Take 2 tablets by mouth 2 (Two) Times a Day.  Dispense: 28 tablet; Refill: 0    Patient has not done  a home test since he doesn't have one.  With close exposure and symptoms, I am going to assume that this is COVID.  He is not a candidate for antibody infusion or paxlovid, so I will treat with antibiotic, steroids, and mucinex.     This visit was completed via secure Zoom connection.       An After Visit Summary was printed and given to the patient at discharge.  Return if symptoms worsen or fail to improve, for Next scheduled follow up.       GENESIS Álvarez 7/18/22    Electronically signed.

## 2022-08-08 ENCOUNTER — TELEMEDICINE (OUTPATIENT)
Dept: FAMILY MEDICINE CLINIC | Facility: CLINIC | Age: 27
End: 2022-08-08

## 2022-08-08 DIAGNOSIS — G56.22 ULNAR NEUROPATHY OF LEFT UPPER EXTREMITY: Primary | ICD-10-CM

## 2022-08-08 PROCEDURE — 99213 OFFICE O/P EST LOW 20 MIN: CPT | Performed by: NURSE PRACTITIONER

## 2022-08-08 NOTE — PROGRESS NOTES
CC: numbness of 4th and 5th finger of left hand    History:  Jared Simpson is a 27 y.o. male who presents today for evaluation of the above problems.      Left hand ring and small finger are numb.  Has seen Dr. Espitia for this previously in March.  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      HPI  ROS:  Review of Systems   Musculoskeletal:        Numbness of fourth and fifth digit of left hand.       No Known Allergies  Past Medical History:   Diagnosis Date   • ADHD (attention deficit hyperactivity disorder)    • Allergic    • Anxiety    • Arm injury     right   • Depression    • Headache    • Wrist injury      Past Surgical History:   Procedure Laterality Date   • COLONOSCOPY N/A 5/23/2017    Procedure: COLONOSCOPY WITH ANESTHESIA;  Surgeon: Janice Bradshaw MD;  Location: [facility] ENDOSCOPY;  Service:    • RHINOPLASTY     • TONSILLECTOMY       Family History   Problem Relation Age of Onset   • No Known Problems Mother    • No Known Problems Father    • No Known Problems Brother    • Brain cancer Maternal Grandmother    • Dementia Maternal Grandfather    • No Known Problems Paternal Grandmother    • Pancreatic cancer Paternal Grandfather       reports that he has been smoking cigarettes. He has a 4.50 pack-year smoking history. He has never used smokeless tobacco. He reports previous alcohol use of about 6.0 standard drinks of alcohol per week. He reports previous drug use. Drug: Marijuana.      Current Outpatient Medications:   •  azithromycin (Zithromax) 250 MG tablet, Take 2 tablets the first day, then 1 tablet daily for 4 days., Disp: 6 tablet, Rfl: 0  •  Cholecalciferol (VITAMIN D) 2000 UNITS tablet, Take 4,000 Units by mouth Daily. Patient takes 4000 units daily, Disp: , Rfl:   •  cyanocobalamin (VITAMIN B-12) 1000 MCG tablet, Take 1,000 mcg by mouth., Disp: , Rfl:   •  Diclofenac Sodium (VOLTAREN) 1 % gel gel, Apply 4 g topically to the appropriate area as directed 4 (Four) Times a Day As Needed (hand  pain)., Disp: 50 g, Rfl: 1  •  FLUoxetine (PROzac) 20 MG capsule, Take 20 mg by mouth Every Morning., Disp: , Rfl:   •  guaiFENesin (Mucinex) 600 MG 12 hr tablet, Take 2 tablets by mouth 2 (Two) Times a Day., Disp: 28 tablet, Rfl: 0  •  haloperidol decanoate (HALDOL DECANOATE) 50 MG/ML injection, 25 mg Every 14 (Fourteen) Days., Disp: , Rfl:   •  KRILL OIL PO, Take 1 tablet by mouth. TAKE 350MG DAILY., Disp: , Rfl:   •  levETIRAcetam (KEPPRA) 500 MG tablet, Take 500 mg by mouth 2 (Two) Times a Day., Disp: , Rfl:   •  lithium (LITHOBID) 300 MG CR tablet, Take 300 mg by mouth 2 (Two) Times a Day., Disp: , Rfl:   •  Lurasidone HCl (Latuda) 20 MG tablet tablet, Take 20 mg by mouth Daily., Disp: , Rfl:   •  metaxalone (SKELAXIN) 800 MG tablet, TAKE 1/2 TO 1 (ONE-HALF TO ONE) TABLET BY MOUTH THREE TIMES DAILY AS NEEDED, Disp: , Rfl:   •  predniSONE (DELTASONE) 10 MG (21) dose pack, Use as directed on package, Disp: 21 tablet, Rfl: 0  •  propranolol (INDERAL) 10 MG tablet, Take 10 mg by mouth 3 (Three) Times a Day., Disp: , Rfl:   •  SUMAtriptan (Imitrex) 100 MG tablet, Take one tablet at onset of headache. May repeat dose one time in 2 hours if headache not relieved., Disp: 9 tablet, Rfl: 2    OBJECTIVE:  There were no vitals taken for this visit.   Physical Exam  Vitals reviewed.   Constitutional:       Appearance: He is well-developed.   Cardiovascular:      Rate and Rhythm: Normal rate.   Pulmonary:      Effort: Pulmonary effort is normal.   Neurological:      Mental Status: He is alert and oriented to person, place, and time.   Psychiatric:         Behavior: Behavior normal.         Assessment/Plan    Diagnoses and all orders for this visit:    1. Ulnar neuropathy of left upper extremity (Primary)  -     Diclofenac Sodium (VOLTAREN) 1 % gel gel; Apply 4 g topically to the appropriate area as directed 4 (Four) Times a Day As Needed (hand pain).  Dispense: 50 g; Refill: 1    Feel like his issue is more likely at wrist  level than elbow level, so I advised carpal tunnel braces at night and when working.     This visit was completed via secure Zoom connection.       An After Visit Summary was printed and given to the patient at discharge.  Return if symptoms worsen or fail to improve, for Next scheduled follow up.       GENESIS Álvarez 8/8/22    Electronically signed.

## 2022-10-03 DIAGNOSIS — G56.22 ULNAR NEUROPATHY OF LEFT UPPER EXTREMITY: ICD-10-CM

## 2022-10-03 NOTE — TELEPHONE ENCOUNTER
Rx Refill Note  Requested Prescriptions     Pending Prescriptions Disp Refills   • Diclofenac Sodium (VOLTAREN) 1 % gel gel [Pharmacy Med Name: DICLOFENAC SODIUM 1% GEL] 100 g 1     Sig: APPLY 4 G TOPICALLY TO THE APPROPRIATE AREA AS DIRECTED 4 (FOUR) TIMES A DAY AS NEEDED (HAND PAIN).      Last office visit with prescribing clinician: 8/8/22      Next office visit with prescribing clinician: Visit date not found            Tika Johnston MA  10/03/22, 07:40 CDT

## 2022-10-26 ENCOUNTER — TELEPHONE (OUTPATIENT)
Dept: FAMILY MEDICINE CLINIC | Facility: CLINIC | Age: 27
End: 2022-10-26

## 2022-10-26 ENCOUNTER — OFFICE VISIT (OUTPATIENT)
Dept: FAMILY MEDICINE CLINIC | Facility: CLINIC | Age: 27
End: 2022-10-26

## 2022-10-26 VITALS
DIASTOLIC BLOOD PRESSURE: 83 MMHG | HEIGHT: 71 IN | OXYGEN SATURATION: 97 % | SYSTOLIC BLOOD PRESSURE: 127 MMHG | BODY MASS INDEX: 24.5 KG/M2 | HEART RATE: 81 BPM | TEMPERATURE: 98.2 F | WEIGHT: 175 LBS

## 2022-10-26 DIAGNOSIS — R09.81 NASAL CONGESTION: Primary | ICD-10-CM

## 2022-10-26 DIAGNOSIS — G44.019 EPISODIC CLUSTER HEADACHE, NOT INTRACTABLE: ICD-10-CM

## 2022-10-26 DIAGNOSIS — R06.02 SHORTNESS OF BREATH: ICD-10-CM

## 2022-10-26 DIAGNOSIS — G43.009 MIGRAINE WITHOUT AURA AND WITHOUT STATUS MIGRAINOSUS, NOT INTRACTABLE: ICD-10-CM

## 2022-10-26 LAB
EXPIRATION DATE: NORMAL
FLUAV AG UPPER RESP QL IA.RAPID: NOT DETECTED
FLUBV AG UPPER RESP QL IA.RAPID: NOT DETECTED
INTERNAL CONTROL: NORMAL
Lab: NORMAL
SARS-COV-2 AG UPPER RESP QL IA.RAPID: NOT DETECTED

## 2022-10-26 PROCEDURE — 99213 OFFICE O/P EST LOW 20 MIN: CPT | Performed by: NURSE PRACTITIONER

## 2022-10-26 PROCEDURE — 87428 SARSCOV & INF VIR A&B AG IA: CPT | Performed by: NURSE PRACTITIONER

## 2022-10-26 RX ORDER — ALPRAZOLAM 0.5 MG/1
TABLET ORAL
COMMUNITY
Start: 2022-10-12 | End: 2023-01-16 | Stop reason: DRUGHIGH

## 2022-10-26 RX ORDER — ALBUTEROL SULFATE 90 UG/1
2 AEROSOL, METERED RESPIRATORY (INHALATION) EVERY 4 HOURS PRN
Qty: 18 G | Refills: 1 | Status: SHIPPED | OUTPATIENT
Start: 2022-10-26

## 2022-10-26 RX ORDER — SUMATRIPTAN 100 MG/1
TABLET, FILM COATED ORAL
Qty: 9 TABLET | Refills: 2 | Status: SHIPPED | OUTPATIENT
Start: 2022-10-26 | End: 2023-01-27 | Stop reason: SDUPTHER

## 2022-10-26 NOTE — TELEPHONE ENCOUNTER
Caller: Lynn Simpson    Relationship: Mother    Best call back number: [phone]    What is the best time to reach you: ANY     Who are you requesting to speak with (clinical staff, provider,  specific staff member): CLINICAL     What was the call regarding: MOTHER STATES THAT PATIENT DOES NOT DO WELL WHEN PRESCRIBED STEROIDS. PATIENT IS SUPPOSE TO BE SEEN AT 3PM TODAY. MOTHER IS WANTING TO LET ANTONY KNOW.     Do you require a callback: YES

## 2022-10-26 NOTE — TELEPHONE ENCOUNTER
Rx Refill Note  Requested Prescriptions     Pending Prescriptions Disp Refills   • SUMAtriptan (Imitrex) 100 MG tablet 9 tablet 2     Sig: Take one tablet at onset of headache. May repeat dose one time in 2 hours if headache not relieved.      Last office visit with prescribing clinician: 3/7/2022      Next office visit with prescribing clinician: 10/26/2022            Tika Johnston MA  10/26/22, 14:35 CDT

## 2022-10-26 NOTE — PROGRESS NOTES
CC: headache and shortness of breath    History:  Jared Simpson is a 27 y.o. male who presents today for evaluation of the above problems.     Patient states that for the past week he has been having migraine headaches over his left eye every day around noon.  He is able to relieve the headaches with the medication.   Does have a history of migraines.  Takes imitrex or tylenol to relieve headache.      He also notes that he has been having shortness of breath every evening with some coughing also.  No other symptoms noted.  No fever.  This has been going on for about a week. He denies any history of asthma. He has not been taking anything for this. He does smoke about a pack per day.        HPI  ROS:  Review of Systems   Respiratory: Positive for cough and shortness of breath.    Neurological: Positive for headaches.       No Known Allergies  Past Medical History:   Diagnosis Date   • ADHD (attention deficit hyperactivity disorder)    • Allergic    • Anxiety    • Arm injury     right   • Depression    • Headache    • Wrist injury      Past Surgical History:   Procedure Laterality Date   • COLONOSCOPY N/A 5/23/2017    Procedure: COLONOSCOPY WITH ANESTHESIA;  Surgeon: Janice Bradshaw MD;  Location: [facility] ENDOSCOPY;  Service:    • RHINOPLASTY     • TONSILLECTOMY       Family History   Problem Relation Age of Onset   • No Known Problems Mother    • No Known Problems Father    • No Known Problems Brother    • Brain cancer Maternal Grandmother    • Dementia Maternal Grandfather    • No Known Problems Paternal Grandmother    • Pancreatic cancer Paternal Grandfather       reports that he has been smoking cigarettes. He has a 4.50 pack-year smoking history. He has never used smokeless tobacco. He reports that he does not currently use alcohol after a past usage of about 6.0 standard drinks per week. He reports that he does not currently use drugs after having used the following drugs: Marijuana.      Current Outpatient  "Medications:   •  Cholecalciferol (VITAMIN D) 2000 UNITS tablet, Take 4,000 Units by mouth Daily. Patient takes 4000 units daily, Disp: , Rfl:   •  Diclofenac Sodium (VOLTAREN) 1 % gel gel, APPLY 4 G TOPICALLY TO THE APPROPRIATE AREA AS DIRECTED 4 (FOUR) TIMES A DAY AS NEEDED (HAND PAIN)., Disp: 100 g, Rfl: 1  •  FLUoxetine (PROzac) 20 MG capsule, Take 20 mg by mouth Every Morning., Disp: , Rfl:   •  guaiFENesin (Mucinex) 600 MG 12 hr tablet, Take 2 tablets by mouth 2 (Two) Times a Day., Disp: 28 tablet, Rfl: 0  •  haloperidol decanoate (HALDOL DECANOATE) 50 MG/ML injection, 25 mg Every 14 (Fourteen) Days., Disp: , Rfl:   •  levETIRAcetam (KEPPRA) 500 MG tablet, Take 250 mg by mouth 2 (Two) Times a Day., Disp: , Rfl:   •  Lurasidone HCl (LATUDA) 20 MG tablet tablet, Take 20 mg by mouth Daily., Disp: , Rfl:   •  metaxalone (SKELAXIN) 800 MG tablet, TAKE 1/2 TO 1 (ONE-HALF TO ONE) TABLET BY MOUTH THREE TIMES DAILY AS NEEDED, Disp: , Rfl:   •  propranolol (INDERAL) 10 MG tablet, Take 10 mg by mouth 3 (Three) Times a Day., Disp: , Rfl:   •  albuterol sulfate  (90 Base) MCG/ACT inhaler, Inhale 2 puffs Every 4 (Four) Hours As Needed for Wheezing., Disp: 18 g, Rfl: 1  •  ALPRAZolam (XANAX) 0.5 MG tablet, TAKE 1 TABLET FOUR TIMES PER DAY AND 1 AT NIGHT, Disp: , Rfl:   •  SUMAtriptan (Imitrex) 100 MG tablet, Take one tablet at onset of headache. May repeat dose one time in 2 hours if headache not relieved., Disp: 9 tablet, Rfl: 2    OBJECTIVE:  /83 (BP Location: Left arm, Patient Position: Sitting, Cuff Size: Adult)   Pulse 81   Temp 98.2 °F (36.8 °C) (Temporal)   Ht 180.3 cm (71\") Comment: pt reported  Wt 79.4 kg (175 lb) Comment: pt reported  SpO2 97%   BMI 24.41 kg/m²    Physical Exam  Vitals reviewed.   Constitutional:       Appearance: He is well-developed.   HENT:      Mouth/Throat:      Mouth: Mucous membranes are moist.      Pharynx: Oropharynx is clear.      Comments: No dental pain with " palpation  Cardiovascular:      Rate and Rhythm: Normal rate.      Heart sounds: Normal heart sounds.   Pulmonary:      Effort: Pulmonary effort is normal.      Breath sounds: Normal breath sounds.   Neurological:      Mental Status: He is alert and oriented to person, place, and time.   Psychiatric:         Behavior: Behavior normal.         Assessment/Plan    Diagnoses and all orders for this visit:    1. Nasal congestion (Primary)  -     POCT SARS-CoV-2 Antigen NATE + Flu    2. Episodic cluster headache, not intractable  -     Ambulatory Referral to Neurology    3. Shortness of breath  -     albuterol sulfate  (90 Base) MCG/ACT inhaler; Inhale 2 puffs Every 4 (Four) Hours As Needed for Wheezing.  Dispense: 18 g; Refill: 1  -     XR Chest PA & Lateral; Future        An After Visit Summary was printed and given to the patient at discharge.  Return in about 1 week (around 11/2/2022) for Recheck - shortness of breath at night.       GENESIS Álvarez 10/26/22  Electronically signed.

## 2022-10-26 NOTE — TELEPHONE ENCOUNTER
Caller:  PATRICK    Relationship:  SELF    Best call back number: [phone]      Requested Prescriptions     Pending Prescriptions Disp Refills   • SUMAtriptan (Imitrex) 100 MG tablet 9 tablet 2     Sig: Take one tablet at onset of headache. May repeat dose one time in 2 hours if headache not relieved.        Pharmacy where request should be sent: [facility]/PHARMACY #[id number] - [address] [phone] [facility] [phone] FX     Does the patient have less than a 3 day supply:  [x] Yes  [] No    Hi [employer] Rep   10/26/22 13:52 CDT     TOTALLY OUT

## 2022-11-02 ENCOUNTER — OFFICE VISIT (OUTPATIENT)
Dept: FAMILY MEDICINE CLINIC | Facility: CLINIC | Age: 27
End: 2022-11-02

## 2022-11-02 VITALS
DIASTOLIC BLOOD PRESSURE: 80 MMHG | TEMPERATURE: 97.8 F | BODY MASS INDEX: 26.49 KG/M2 | RESPIRATION RATE: 16 BRPM | OXYGEN SATURATION: 97 % | SYSTOLIC BLOOD PRESSURE: 122 MMHG | HEIGHT: 71 IN | HEART RATE: 73 BPM | WEIGHT: 189.2 LBS

## 2022-11-02 DIAGNOSIS — Z23 NEED FOR INFLUENZA VACCINATION: Primary | ICD-10-CM

## 2022-11-02 DIAGNOSIS — J40 BRONCHITIS: ICD-10-CM

## 2022-11-02 PROCEDURE — G0008 ADMIN INFLUENZA VIRUS VAC: HCPCS | Performed by: FAMILY MEDICINE

## 2022-11-02 PROCEDURE — 90686 IIV4 VACC NO PRSV 0.5 ML IM: CPT | Performed by: FAMILY MEDICINE

## 2022-11-02 PROCEDURE — 99213 OFFICE O/P EST LOW 20 MIN: CPT | Performed by: FAMILY MEDICINE

## 2022-11-02 RX ORDER — LEVETIRACETAM 250 MG/1
TABLET ORAL 2 TIMES DAILY
COMMUNITY
Start: 2022-10-31

## 2022-11-02 NOTE — PROGRESS NOTES
Subjective   Jared Simpson is a 27 y.o. male.     History of Present Illness  27-year-old male follow-up for shortness of breath after albuterol usage-improved      The following portions of the patient's history were reviewed and updated as appropriate: allergies, current medications, past family history, past medical history, past social history, past surgical history and problem list.    Review of Systems   Respiratory: Positive for cough.         Smoker-feel like mucous plugging is causing his shortness of breath   Cardiovascular: Negative for chest pain and leg swelling.   Musculoskeletal:        Advised to try tapering Skelaxin       Objective   Physical Exam  Vitals and nursing note reviewed.   Constitutional:       Appearance: Normal appearance.   HENT:      Right Ear: Tympanic membrane and ear canal normal.      Left Ear: Tympanic membrane and ear canal normal.   Eyes:      Extraocular Movements: Extraocular movements intact.      Pupils: Pupils are equal, round, and reactive to light.   Cardiovascular:      Rate and Rhythm: Normal rate and regular rhythm.   Pulmonary:      Effort: Pulmonary effort is normal.      Breath sounds: Rhonchi present. No wheezing.   Musculoskeletal:      Right lower leg: No edema.      Left lower leg: No edema.   Skin:     General: Skin is warm and dry.   Neurological:      General: No focal deficit present.      Mental Status: He is alert and oriented to person, place, and time.   Psychiatric:         Mood and Affect: Mood normal.         Behavior: Behavior normal.         Assessment & Plan    Diagnoses and all orders for this visit:    1. Need for influenza vaccination (Primary)  -     FluLaval/Fluarix/Fluzone >6 Months    2. Bronchitis       Advised to use albuterol as needed-decrease smoking- try tapering skelaxin  Flu shot

## 2022-11-28 ENCOUNTER — TELEMEDICINE (OUTPATIENT)
Dept: FAMILY MEDICINE CLINIC | Facility: CLINIC | Age: 27
End: 2022-11-28

## 2022-11-28 DIAGNOSIS — J01.00 ACUTE NON-RECURRENT MAXILLARY SINUSITIS: Primary | ICD-10-CM

## 2022-11-28 PROCEDURE — 99213 OFFICE O/P EST LOW 20 MIN: CPT | Performed by: NURSE PRACTITIONER

## 2022-11-28 RX ORDER — GUAIFENESIN 600 MG/1
1200 TABLET, EXTENDED RELEASE ORAL 2 TIMES DAILY
Qty: 28 TABLET | Refills: 0 | Status: SHIPPED | OUTPATIENT
Start: 2022-11-28 | End: 2022-12-05

## 2022-11-28 RX ORDER — AMOXICILLIN AND CLAVULANATE POTASSIUM 875; 125 MG/1; MG/1
1 TABLET, FILM COATED ORAL 2 TIMES DAILY
Qty: 28 TABLET | Refills: 0 | Status: SHIPPED | OUTPATIENT
Start: 2022-11-28 | End: 2023-01-16

## 2022-11-28 NOTE — PROGRESS NOTES
CC: congestion, cough    History:  Jared Simpson is a 27 y.o. male who presents today for evaluation of the above problems.      Congestion, cough, headache since Friday. Chills up until yesterday. Mucous is yellow green. Short of breath when he lays down. COVID is negative on home test. No body aches. Legs feel weak.    HPI  ROS:  Review of Systems   Constitutional: Positive for chills and fatigue.   HENT: Positive for congestion and rhinorrhea.    Respiratory: Positive for cough and shortness of breath.    Neurological: Positive for headaches.       No Known Allergies  Past Medical History:   Diagnosis Date   • ADHD (attention deficit hyperactivity disorder)    • Allergic    • Anxiety    • Arm injury     right   • Depression    • Headache    • Wrist injury      Past Surgical History:   Procedure Laterality Date   • COLONOSCOPY N/A 5/23/2017    Procedure: COLONOSCOPY WITH ANESTHESIA;  Surgeon: Janice Bradshaw MD;  Location: [facility] ENDOSCOPY;  Service:    • RHINOPLASTY     • TONSILLECTOMY       Family History   Problem Relation Age of Onset   • No Known Problems Mother    • No Known Problems Father    • No Known Problems Brother    • Brain cancer Maternal Grandmother    • Dementia Maternal Grandfather    • No Known Problems Paternal Grandmother    • Pancreatic cancer Paternal Grandfather       reports that he has been smoking cigarettes. He has a 4.50 pack-year smoking history. He has never used smokeless tobacco. He reports that he does not currently use alcohol after a past usage of about 6.0 standard drinks per week. He reports that he does not currently use drugs after having used the following drugs: Marijuana.      Current Outpatient Medications:   •  albuterol sulfate  (90 Base) MCG/ACT inhaler, Inhale 2 puffs Every 4 (Four) Hours As Needed for Wheezing., Disp: 18 g, Rfl: 1  •  ALPRAZolam (XANAX) 0.5 MG tablet, TAKE 1 TABLET FOUR TIMES PER DAY AND 1 AT NIGHT, Disp: , Rfl:   •  amoxicillin-clavulanate  (Augmentin) 875-125 MG per tablet, Take 1 tablet by mouth 2 (Two) Times a Day., Disp: 28 tablet, Rfl: 0  •  Cholecalciferol (VITAMIN D) 2000 UNITS tablet, Take 4,000 Units by mouth Daily. Patient takes 4000 units daily, Disp: , Rfl:   •  Diclofenac Sodium (VOLTAREN) 1 % gel gel, APPLY 4 G TOPICALLY TO THE APPROPRIATE AREA AS DIRECTED 4 (FOUR) TIMES A DAY AS NEEDED (HAND PAIN)., Disp: 100 g, Rfl: 1  •  FLUoxetine (PROzac) 20 MG capsule, Take 20 mg by mouth Every Morning., Disp: , Rfl:   •  guaiFENesin (Mucinex) 600 MG 12 hr tablet, Take 2 tablets by mouth 2 (Two) Times a Day., Disp: 28 tablet, Rfl: 0  •  guaiFENesin (Mucinex) 600 MG 12 hr tablet, Take 2 tablets by mouth 2 (Two) Times a Day for 7 days., Disp: 28 tablet, Rfl: 0  •  haloperidol decanoate (HALDOL DECANOATE) 50 MG/ML injection, 25 mg Every 14 (Fourteen) Days., Disp: , Rfl:   •  levETIRAcetam (KEPPRA) 250 MG tablet, 2 (Two) Times a Day., Disp: , Rfl:   •  Lurasidone HCl (LATUDA) 20 MG tablet tablet, Take 20 mg by mouth Daily., Disp: , Rfl:   •  metaxalone (SKELAXIN) 800 MG tablet, TAKE 1/2 TO 1 (ONE-HALF TO ONE) TABLET BY MOUTH THREE TIMES DAILY AS NEEDED, Disp: , Rfl:   •  propranolol (INDERAL) 10 MG tablet, Take 10 mg by mouth 3 (Three) Times a Day., Disp: , Rfl:   •  SUMAtriptan (Imitrex) 100 MG tablet, Take one tablet at onset of headache. May repeat dose one time in 2 hours if headache not relieved., Disp: 9 tablet, Rfl: 2    OBJECTIVE:  There were no vitals taken for this visit.   Physical Exam  Vitals reviewed.   Constitutional:       Appearance: He is well-developed.   Pulmonary:      Effort: Pulmonary effort is normal.   Neurological:      Mental Status: He is alert and oriented to person, place, and time.   Psychiatric:         Behavior: Behavior normal.         Assessment/Plan    Diagnoses and all orders for this visit:    1. Acute non-recurrent maxillary sinusitis (Primary)  -     amoxicillin-clavulanate (Augmentin) 875-125 MG per tablet; Take  1 tablet by mouth 2 (Two) Times a Day.  Dispense: 28 tablet; Refill: 0  -     guaiFENesin (Mucinex) 600 MG 12 hr tablet; Take 2 tablets by mouth 2 (Two) Times a Day for 7 days.  Dispense: 28 tablet; Refill: 0    This visit was completed via secure Zoom connection.   Patient was at his home and I was in my office.     An After Visit Summary was printed and given to the patient at discharge.  Return if symptoms worsen or fail to improve, for Next scheduled follow up.       GENESIS Álvarez 11/28/22    Electronically signed.

## 2023-01-16 ENCOUNTER — OFFICE VISIT (OUTPATIENT)
Dept: FAMILY MEDICINE CLINIC | Facility: CLINIC | Age: 28
End: 2023-01-16
Payer: MEDICARE

## 2023-01-16 VITALS
WEIGHT: 170.8 LBS | HEIGHT: 71 IN | SYSTOLIC BLOOD PRESSURE: 120 MMHG | BODY MASS INDEX: 23.91 KG/M2 | DIASTOLIC BLOOD PRESSURE: 72 MMHG | RESPIRATION RATE: 16 BRPM | OXYGEN SATURATION: 97 % | TEMPERATURE: 97.3 F | HEART RATE: 82 BPM

## 2023-01-16 DIAGNOSIS — R53.83 OTHER FATIGUE: Primary | ICD-10-CM

## 2023-01-16 DIAGNOSIS — E78.2 MIXED HYPERLIPIDEMIA: ICD-10-CM

## 2023-01-16 PROCEDURE — 1159F MED LIST DOCD IN RCRD: CPT | Performed by: FAMILY MEDICINE

## 2023-01-16 PROCEDURE — G0439 PPPS, SUBSEQ VISIT: HCPCS | Performed by: FAMILY MEDICINE

## 2023-01-16 PROCEDURE — 1126F AMNT PAIN NOTED NONE PRSNT: CPT | Performed by: FAMILY MEDICINE

## 2023-01-16 PROCEDURE — 1170F FXNL STATUS ASSESSED: CPT | Performed by: FAMILY MEDICINE

## 2023-01-16 PROCEDURE — 96160 PT-FOCUSED HLTH RISK ASSMT: CPT | Performed by: FAMILY MEDICINE

## 2023-01-16 RX ORDER — ALPRAZOLAM 0.25 MG/1
0.25 TABLET ORAL 4 TIMES DAILY
COMMUNITY
Start: 2022-12-19

## 2023-01-16 NOTE — PROGRESS NOTES
Chief Complaint   Patient presents with   • Medicare Wellness-subsequent     Not fasting       History:  Jared Simpson is a 27 y.o. male who presents today for evaluation of the above problems.      27-year-old male for wellness physical        ROS:  Review of Systems   Respiratory: Negative for shortness of breath.    Cardiovascular: Negative for chest pain and leg swelling.   Genitourinary:        No herpes outbreak in the last year   Musculoskeletal: Negative for arthralgias.   Psychiatric/Behavioral:        Sees psychiatrist regularly-diagnoses bipolar disorder 1   All other systems reviewed and are negative.      No Known Allergies  Past Medical History:   Diagnosis Date   • ADHD (attention deficit hyperactivity disorder)    • Allergic    • Anxiety    • Arm injury     right   • Depression    • Headache    • Wrist injury      Past Surgical History:   Procedure Laterality Date   • COLONOSCOPY N/A 5/23/2017    Procedure: COLONOSCOPY WITH ANESTHESIA;  Surgeon: Janice Bradshaw MD;  Location: [facility] ENDOSCOPY;  Service:    • RHINOPLASTY     • TONSILLECTOMY       Family History   Problem Relation Age of Onset   • No Known Problems Mother    • No Known Problems Father    • No Known Problems Brother    • Brain cancer Maternal Grandmother    • Dementia Maternal Grandfather    • No Known Problems Paternal Grandmother    • Pancreatic cancer Paternal Grandfather       reports that he has been smoking cigarettes. He started smoking about 14 years ago. He has a 9.75 pack-year smoking history. He has been exposed to tobacco smoke. He has never used smokeless tobacco. He reports that he does not currently use alcohol after a past usage of about 6.0 standard drinks per week. He reports that he does not currently use drugs after having used the following drugs: Marijuana.      Current Outpatient Medications:   •  albuterol sulfate  (90 Base) MCG/ACT inhaler, Inhale 2 puffs Every 4 (Four) Hours As Needed for Wheezing., Disp:  "18 g, Rfl: 1  •  ALPRAZolam (XANAX) 0.25 MG tablet, Take 0.25 mg by mouth 4 (Four) Times a Day., Disp: , Rfl:   •  Cholecalciferol (VITAMIN D) 2000 UNITS tablet, Take 4,000 Units by mouth Daily. Patient takes 4000 units daily, Disp: , Rfl:   •  Diclofenac Sodium (VOLTAREN) 1 % gel gel, APPLY 4 G TOPICALLY TO THE APPROPRIATE AREA AS DIRECTED 4 (FOUR) TIMES A DAY AS NEEDED (HAND PAIN)., Disp: 100 g, Rfl: 1  •  FLUoxetine (PROzac) 20 MG capsule, Take 20 mg by mouth Every Morning., Disp: , Rfl:   •  haloperidol decanoate (HALDOL DECANOATE) 50 MG/ML injection, 25 mg Every 14 (Fourteen) Days., Disp: , Rfl:   •  levETIRAcetam (KEPPRA) 250 MG tablet, 2 (Two) Times a Day., Disp: , Rfl:   •  Lurasidone HCl (LATUDA) 20 MG tablet tablet, Take 20 mg by mouth Daily., Disp: , Rfl:   •  metaxalone (SKELAXIN) 800 MG tablet, TAKE 1/2 TO 1 (ONE-HALF TO ONE) TABLET BY MOUTH THREE TIMES DAILY AS NEEDED, Disp: , Rfl:   •  propranolol (INDERAL) 10 MG tablet, Take 10 mg by mouth 3 (Three) Times a Day., Disp: , Rfl:   •  SUMAtriptan (Imitrex) 100 MG tablet, Take one tablet at onset of headache. May repeat dose one time in 2 hours if headache not relieved., Disp: 9 tablet, Rfl: 2    OBJECTIVE:  /72 (BP Location: Left arm, Patient Position: Sitting, Cuff Size: Adult)   Pulse 82   Temp 97.3 °F (36.3 °C) (Temporal)   Resp 16   Ht 180.3 cm (71\")   Wt 77.5 kg (170 lb 12.8 oz)   SpO2 97%   BMI 23.82 kg/m²    Physical Exam  Vitals and nursing note reviewed.   Constitutional:       Appearance: Normal appearance.   HENT:      Right Ear: Tympanic membrane and ear canal normal.      Left Ear: Tympanic membrane and ear canal normal.   Eyes:      Extraocular Movements: Extraocular movements intact.      Pupils: Pupils are equal, round, and reactive to light.   Neck:      Vascular: No carotid bruit.   Cardiovascular:      Rate and Rhythm: Normal rate and regular rhythm.      Pulses: Normal pulses.      Heart sounds: Normal heart sounds. "   Pulmonary:      Effort: Pulmonary effort is normal.      Breath sounds: Normal breath sounds.   Abdominal:      General: Abdomen is flat.      Palpations: Abdomen is soft. There is no mass.   Genitourinary:     Comments: No testicular masses inguinal hernia or adenopathy no herpetic outbreak on penis  Musculoskeletal:      Right lower leg: No edema.      Left lower leg: No edema.   Lymphadenopathy:      Cervical: No cervical adenopathy.   Skin:     General: Skin is warm and dry.   Neurological:      General: No focal deficit present.      Mental Status: He is alert and oriented to person, place, and time.   Psychiatric:         Mood and Affect: Mood normal.         Behavior: Behavior normal.         BMI is within normal parameters. No other follow-up for BMI required.      Health Maintenance:  Immunization History   Administered Date(s) Administered   • COVID-19 (MODERNA) 1st, 2nd, 3rd Dose Only 04/01/2021, 04/29/2021, 12/20/2021   • FluLaval/Fluzone >6mos 10/05/2017, 10/31/2018, 12/01/2019, 10/04/2021, 11/02/2022   • Fluzone Quad >6mos (Multi-dose) 10/05/2017, 10/31/2018        Up-to-date    Assessment/Plan    Diagnoses and all orders for this visit:    1. Other fatigue (Primary)  -     TSH  -     T4, free  -     CBC & Differential    2. Mixed hyperlipidemia  -     Comprehensive Metabolic Panel  -     Lipid Panel          An After Visit Summary was printed and given to the patient at discharge.  Return in 6 months (on 7/16/2023).         Morris Espitia MD 1/17/2023   Electronically signed.

## 2023-01-17 LAB
ALBUMIN SERPL-MCNC: 4.4 G/DL (ref 3.5–5.2)
ALBUMIN/GLOB SERPL: 2.1 G/DL
ALP SERPL-CCNC: 82 U/L (ref 39–117)
ALT SERPL-CCNC: 13 U/L (ref 1–41)
AST SERPL-CCNC: 16 U/L (ref 1–40)
BASOPHILS # BLD AUTO: 0.05 10*3/MM3 (ref 0–0.2)
BASOPHILS NFR BLD AUTO: 0.6 % (ref 0–1.5)
BILIRUB SERPL-MCNC: 0.3 MG/DL (ref 0–1.2)
BUN SERPL-MCNC: 8 MG/DL (ref 6–20)
BUN/CREAT SERPL: 8.2 (ref 7–25)
CALCIUM SERPL-MCNC: 9.4 MG/DL (ref 8.6–10.5)
CHLORIDE SERPL-SCNC: 102 MMOL/L (ref 98–107)
CHOLEST SERPL-MCNC: 160 MG/DL (ref 0–200)
CO2 SERPL-SCNC: 28.8 MMOL/L (ref 22–29)
CREAT SERPL-MCNC: 0.97 MG/DL (ref 0.76–1.27)
EGFRCR SERPLBLD CKD-EPI 2021: 108.4 ML/MIN/1.73
EOSINOPHIL # BLD AUTO: 0.25 10*3/MM3 (ref 0–0.4)
EOSINOPHIL NFR BLD AUTO: 3 % (ref 0.3–6.2)
ERYTHROCYTE [DISTWIDTH] IN BLOOD BY AUTOMATED COUNT: 12.7 % (ref 12.3–15.4)
GLOBULIN SER CALC-MCNC: 2.1 GM/DL
GLUCOSE SERPL-MCNC: 82 MG/DL (ref 65–99)
HCT VFR BLD AUTO: 44.2 % (ref 37.5–51)
HDLC SERPL-MCNC: 52 MG/DL (ref 40–60)
HGB BLD-MCNC: 15.5 G/DL (ref 13–17.7)
IMM GRANULOCYTES # BLD AUTO: 0.01 10*3/MM3 (ref 0–0.05)
IMM GRANULOCYTES NFR BLD AUTO: 0.1 % (ref 0–0.5)
LDLC SERPL CALC-MCNC: 72 MG/DL (ref 0–100)
LYMPHOCYTES # BLD AUTO: 2.54 10*3/MM3 (ref 0.7–3.1)
LYMPHOCYTES NFR BLD AUTO: 30.9 % (ref 19.6–45.3)
MCH RBC QN AUTO: 32 PG (ref 26.6–33)
MCHC RBC AUTO-ENTMCNC: 35.1 G/DL (ref 31.5–35.7)
MCV RBC AUTO: 91.1 FL (ref 79–97)
MONOCYTES # BLD AUTO: 0.72 10*3/MM3 (ref 0.1–0.9)
MONOCYTES NFR BLD AUTO: 8.8 % (ref 5–12)
NEUTROPHILS # BLD AUTO: 4.64 10*3/MM3 (ref 1.7–7)
NEUTROPHILS NFR BLD AUTO: 56.6 % (ref 42.7–76)
NRBC BLD AUTO-RTO: 0 /100 WBC (ref 0–0.2)
PLATELET # BLD AUTO: 299 10*3/MM3 (ref 140–450)
POTASSIUM SERPL-SCNC: 4 MMOL/L (ref 3.5–5.2)
PROT SERPL-MCNC: 6.5 G/DL (ref 6–8.5)
RBC # BLD AUTO: 4.85 10*6/MM3 (ref 4.14–5.8)
SODIUM SERPL-SCNC: 141 MMOL/L (ref 136–145)
T4 FREE SERPL-MCNC: 1.05 NG/DL (ref 0.93–1.7)
TRIGL SERPL-MCNC: 221 MG/DL (ref 0–150)
TSH SERPL DL<=0.005 MIU/L-ACNC: 1.53 UIU/ML (ref 0.27–4.2)
VLDLC SERPL CALC-MCNC: 36 MG/DL (ref 5–40)
WBC # BLD AUTO: 8.21 10*3/MM3 (ref 3.4–10.8)

## 2023-01-18 NOTE — PROGRESS NOTES
The ABCs of the Annual Wellness Visit  Subsequent Medicare Wellness Visit    Chief Complaint   Patient presents with   • Medicare Wellness-subsequent     Not fasting      Subjective    History of Present Illness:  Jared Simpson is a 27 y.o. male who presents for a Subsequent Medicare Wellness Visit.    The following portions of the patient's history were reviewed and   updated as appropriate: allergies, current medications, past family history, past medical history, past social history, past surgical history and problem list.    Compared to one year ago, the patient feels his physical   health is better.    Compared to one year ago, the patient feels his mental   health is better.    Recent Hospitalizations:  He was not admitted to the hospital during the last year.       Current Medical Providers:  Patient Care Team:  Morris Espitia MD as PCP - General (Family Medicine)    Outpatient Medications Prior to Visit   Medication Sig Dispense Refill   • albuterol sulfate  (90 Base) MCG/ACT inhaler Inhale 2 puffs Every 4 (Four) Hours As Needed for Wheezing. 18 g 1   • ALPRAZolam (XANAX) 0.25 MG tablet Take 0.25 mg by mouth 4 (Four) Times a Day.     • Cholecalciferol (VITAMIN D) 2000 UNITS tablet Take 4,000 Units by mouth Daily. Patient takes 4000 units daily     • Diclofenac Sodium (VOLTAREN) 1 % gel gel APPLY 4 G TOPICALLY TO THE APPROPRIATE AREA AS DIRECTED 4 (FOUR) TIMES A DAY AS NEEDED (HAND PAIN). 100 g 1   • FLUoxetine (PROzac) 20 MG capsule Take 20 mg by mouth Every Morning.     • haloperidol decanoate (HALDOL DECANOATE) 50 MG/ML injection 25 mg Every 14 (Fourteen) Days.     • levETIRAcetam (KEPPRA) 250 MG tablet 2 (Two) Times a Day.     • Lurasidone HCl (LATUDA) 20 MG tablet tablet Take 20 mg by mouth Daily.     • metaxalone (SKELAXIN) 800 MG tablet TAKE 1/2 TO 1 (ONE-HALF TO ONE) TABLET BY MOUTH THREE TIMES DAILY AS NEEDED     • propranolol (INDERAL) 10 MG tablet Take 10 mg by mouth 3 (Three) Times  "a Day.     • SUMAtriptan (Imitrex) 100 MG tablet Take one tablet at onset of headache. May repeat dose one time in 2 hours if headache not relieved. 9 tablet 2   • ALPRAZolam (XANAX) 0.5 MG tablet TAKE 1 TABLET FOUR TIMES PER DAY AND 1 AT NIGHT     • amoxicillin-clavulanate (Augmentin) 875-125 MG per tablet Take 1 tablet by mouth 2 (Two) Times a Day. 28 tablet 0   • guaiFENesin (Mucinex) 600 MG 12 hr tablet Take 2 tablets by mouth 2 (Two) Times a Day. 28 tablet 0     No facility-administered medications prior to visit.       No opioid medication identified on active medication list. I have reviewed chart for other potential  high risk medication/s and harmful drug interactions in the elderly.          Aspirin is not on active medication list.  Aspirin use is not indicated based on review of current medical condition/s. Risk of harm outweighs potential benefits.  .    Patient Active Problem List   Diagnosis   • Bipolar 1 disorder (HCC)   • Diarrhea   • Abdominal cramping   • Family history of colon cancer     Advance Care Planning  Advance Directive is not on file.  ACP discussion was declined by the patient. Patient does not have an advance directive, declines further assistance.    Review of Systems   Respiratory: Negative.         Continues to smoke-encouraged not to   Cardiovascular: Negative.    Gastrointestinal: Negative.    Genitourinary:        No herpetic outbreak this year   Psychiatric/Behavioral: Positive for dysphoric mood.        Monitored by psychiatrist regular basis   All other systems reviewed and are negative.       Objective    Vitals:    01/16/23 1537   BP: 120/72   BP Location: Left arm   Patient Position: Sitting   Cuff Size: Adult   Pulse: 82   Resp: 16   Temp: 97.3 °F (36.3 °C)   TempSrc: Temporal   SpO2: 97%   Weight: 77.5 kg (170 lb 12.8 oz)   Height: 180.3 cm (71\")   PainSc: 0-No pain     Estimated body mass index is 23.82 kg/m² as calculated from the following:    Height as of this " "encounter: 180.3 cm (71\").    Weight as of this encounter: 77.5 kg (170 lb 12.8 oz).    BMI is within normal parameters. No other follow-up for BMI required.      Does the patient have evidence of cognitive impairment? No    Physical Exam  Vitals and nursing note reviewed.   Constitutional:       Appearance: Normal appearance.   HENT:      Right Ear: Tympanic membrane and ear canal normal.      Left Ear: Tympanic membrane and ear canal normal.   Eyes:      Extraocular Movements: Extraocular movements intact.      Pupils: Pupils are equal, round, and reactive to light.   Neck:      Vascular: No carotid bruit.   Cardiovascular:      Rate and Rhythm: Normal rate and regular rhythm.      Pulses: Normal pulses.      Heart sounds: Normal heart sounds.   Pulmonary:      Effort: Pulmonary effort is normal.      Breath sounds: Normal breath sounds.   Abdominal:      General: Abdomen is flat.      Palpations: Abdomen is soft. There is no mass.   Genitourinary:     Penis: Normal.       Testes: Normal.      Comments: No skin lesions  Musculoskeletal:      Right lower leg: No edema.      Left lower leg: No edema.   Lymphadenopathy:      Cervical: No cervical adenopathy.   Skin:     General: Skin is warm and dry.   Neurological:      General: No focal deficit present.      Mental Status: He is alert and oriented to person, place, and time.   Psychiatric:         Mood and Affect: Mood normal.         Behavior: Behavior normal.         Thought Content: Thought content normal.         Judgment: Judgment normal.       Lab Results   Component Value Date    CHLPL 160 01/16/2023    TRIG 221 (H) 01/16/2023    HDL 52 01/16/2023    LDL 72 01/16/2023    VLDL 36 01/16/2023            HEALTH RISK ASSESSMENT    Smoking Status:  Social History     Tobacco Use   Smoking Status Every Day   • Packs/day: 0.75   • Years: 13.00   • Pack years: 9.75   • Types: Cigarettes   • Start date: 2009   • Passive exposure: Current   Smokeless Tobacco Never "     Alcohol Consumption:  Social History     Substance and Sexual Activity   Alcohol Use Not Currently   • Alcohol/week: 6.0 standard drinks   • Types: 6 Cans of beer per week    Comment: oc.     Fall Risk Screen:    UMAIR Fall Risk Assessment was completed, and patient is at LOW risk for falls.Assessment completed on:1/16/2023    Depression Screening:  PHQ-2/PHQ-9 Depression Screening 1/16/2023   Retired PHQ-9 Total Score -   Retired Total Score -   Little Interest or Pleasure in Doing Things 0-->not at all   Feeling Down, Depressed or Hopeless 0-->not at all   PHQ-9: Brief Depression Severity Measure Score 0       Health Habits and Functional and Cognitive Screening:  Functional & Cognitive Status 1/16/2023   Do you have difficulty preparing food and eating? No   Do you have difficulty bathing yourself, getting dressed or grooming yourself? No   Do you have difficulty using the toilet? No   Do you have difficulty moving around from place to place? No   Do you have trouble with steps or getting out of a bed or a chair? No   Current Diet Well Balanced Diet   Dental Exam Up to date   Eye Exam Up to date   Exercise (times per week) 2 times per week   Current Exercises Include No Regular Exercise;Bicycling Outdoors;Walking   Do you need help using the phone?  No   Are you deaf or do you have serious difficulty hearing?  No   Do you need help with transportation? Yes   Do you need help shopping? No   Do you need help preparing meals?  Yes   Do you need help with housework?  Yes   Do you need help with laundry? No   Do you need help taking your medications? Yes   Do you need help managing money? No   Do you ever drive or ride in a car without wearing a seat belt? No   Have you felt unusual stress, anger or loneliness in the last month? No   Who do you live with? Other   If you need help, do you have trouble finding someone available to you? No   Have you been bothered in the last four weeks by sexual problems? No   Do  you have difficulty concentrating, remembering or making decisions? Yes       Age-appropriate Screening Schedule:  Refer to the list below for future screening recommendations based on patient's age, sex and/or medical conditions. Orders for these recommended tests are listed in the plan section. The patient has been provided with a written plan.    Health Maintenance   Topic Date Due   • TDAP/TD VACCINES (1 - Tdap) 03/07/2023 (Originally 3/8/2012)   • LIPID PANEL  01/16/2024   • INFLUENZA VACCINE  Completed              Assessment & Plan   CMS Preventative Services Quick Reference  Risk Factors Identified During Encounter  Depression/Dysphoria: Current medication is effective, no change recommended  Drug Use/Abuse Identified or Suspected: See psychiatrist regularly  The above risks/problems have been discussed with the patient.  Follow up actions/plans if indicated are seen below in the Assessment/Plan Section.  Pertinent information has been shared with the patient in the After Visit Summary.    Diagnoses and all orders for this visit:    1. Other fatigue (Primary)  -     TSH  -     T4, free  -     CBC & Differential    2. Mixed hyperlipidemia  -     Comprehensive Metabolic Panel  -     Lipid Panel        Follow Up:   Return in 6 months (on 7/16/2023).     An After Visit Summary and PPPS were made available to the patient.          I spent 25 minutes caring for Jared on this date of service. This time includes time spent by me in the following activities:preparing for the visit, reviewing tests, obtaining and/or reviewing a separately obtained history, performing a medically appropriate examination and/or evaluation , counseling and educating the patient/family/caregiver, ordering medications, tests, or procedures and documenting information in the medical record     Plan above-ongoing monitoring by psychiatrist-has completed flu vaccination-smoking cessation is advised

## 2023-01-27 DIAGNOSIS — G43.009 MIGRAINE WITHOUT AURA AND WITHOUT STATUS MIGRAINOSUS, NOT INTRACTABLE: ICD-10-CM

## 2023-01-27 RX ORDER — SUMATRIPTAN 100 MG/1
TABLET, FILM COATED ORAL
Qty: 9 TABLET | Refills: 2 | Status: SHIPPED | OUTPATIENT
Start: 2023-01-27

## 2023-01-27 NOTE — TELEPHONE ENCOUNTER
Rx Refill Note  Requested Prescriptions     Pending Prescriptions Disp Refills   • SUMAtriptan (Imitrex) 100 MG tablet 9 tablet 2     Sig: Take one tablet at onset of headache. May repeat dose one time in 2 hours if headache not relieved.      Last office visit with prescribing clinician: 1/16/2023   Last telemedicine visit with prescribing clinician: 7/17/2023   Next office visit with prescribing clinician: 7/17/2023                         Would you like a call back once the refill request has been completed: [] Yes [] No    If the office needs to give you a call back, can they leave a voicemail: [] Yes [] No    Hi Ortiz Rep  01/27/23, 11:45 CST

## 2023-02-23 ENCOUNTER — OFFICE VISIT (OUTPATIENT)
Dept: FAMILY MEDICINE CLINIC | Facility: CLINIC | Age: 28
End: 2023-02-23
Payer: MEDICARE

## 2023-02-23 VITALS
WEIGHT: 169 LBS | BODY MASS INDEX: 23.66 KG/M2 | HEIGHT: 71 IN | DIASTOLIC BLOOD PRESSURE: 65 MMHG | SYSTOLIC BLOOD PRESSURE: 106 MMHG | OXYGEN SATURATION: 98 % | TEMPERATURE: 98.4 F | HEART RATE: 83 BPM

## 2023-02-23 DIAGNOSIS — S67.191A CRUSHING INJURY OF LEFT INDEX FINGER, INITIAL ENCOUNTER: Primary | ICD-10-CM

## 2023-02-23 PROCEDURE — 99213 OFFICE O/P EST LOW 20 MIN: CPT | Performed by: NURSE PRACTITIONER

## 2023-02-23 NOTE — PROGRESS NOTES
CC: left index finger pain    History:  Jared Simpson is a 27 y.o. male who presents today for evaluation of the above problems.     Patient states that he dropped approximately 40 pounds of wood on his left index finger last week.  Initially the swelling was significant, however, this has gone down a great degree though the finger does remain swollen.  States that he can feel a clicking sensation inside his finger when he tries to bend it.  He went to type on a keyboard this morning and it shot out a sharp pain up his arm.     HPI  ROS:  Review of Systems   Musculoskeletal:        Left index finger pain and swelling       No Known Allergies  Past Medical History:   Diagnosis Date   • ADHD (attention deficit hyperactivity disorder)    • Allergic    • Anxiety    • Arm injury     right   • Depression    • Headache    • Wrist injury      Past Surgical History:   Procedure Laterality Date   • COLONOSCOPY N/A 5/23/2017    Procedure: COLONOSCOPY WITH ANESTHESIA;  Surgeon: Janice Bradshaw MD;  Location: [facility] ENDOSCOPY;  Service:    • RHINOPLASTY     • TONSILLECTOMY       Family History   Problem Relation Age of Onset   • No Known Problems Mother    • No Known Problems Father    • No Known Problems Brother    • Brain cancer Maternal Grandmother    • Dementia Maternal Grandfather    • No Known Problems Paternal Grandmother    • Pancreatic cancer Paternal Grandfather       reports that he has been smoking cigarettes. He started smoking about 14 years ago. He has a 9.75 pack-year smoking history. He has been exposed to tobacco smoke. He has never used smokeless tobacco. He reports that he does not currently use alcohol after a past usage of about 6.0 standard drinks per week. He reports that he does not currently use drugs after having used the following drugs: Marijuana.      Current Outpatient Medications:   •  albuterol sulfate  (90 Base) MCG/ACT inhaler, Inhale 2 puffs Every 4 (Four) Hours As Needed for Wheezing.,  "Disp: 18 g, Rfl: 1  •  ALPRAZolam (XANAX) 0.25 MG tablet, Take 0.25 mg by mouth 4 (Four) Times a Day., Disp: , Rfl:   •  Cholecalciferol (VITAMIN D) 2000 UNITS tablet, Take 4,000 Units by mouth Daily. Patient takes 4000 units daily, Disp: , Rfl:   •  haloperidol decanoate (HALDOL DECANOATE) 50 MG/ML injection, 50 mg 1 (One) Time Per Week., Disp: , Rfl:   •  levETIRAcetam (KEPPRA) 250 MG tablet, 2 (Two) Times a Day., Disp: , Rfl:   •  Lurasidone HCl (LATUDA) 20 MG tablet tablet, Take 20 mg by mouth Daily., Disp: , Rfl:   •  metaxalone (SKELAXIN) 800 MG tablet, TAKE 1/2 TO 1 (ONE-HALF TO ONE) TABLET BY MOUTH THREE TIMES DAILY AS NEEDED, Disp: , Rfl:   •  propranolol (INDERAL) 10 MG tablet, Take 10 mg by mouth 3 (Three) Times a Day., Disp: , Rfl:   •  SUMAtriptan (Imitrex) 100 MG tablet, Take one tablet at onset of headache. May repeat dose one time in 2 hours if headache not relieved., Disp: 9 tablet, Rfl: 2  •  FLUoxetine (PROzac) 20 MG capsule, Take 20 mg by mouth Every Morning., Disp: , Rfl:     OBJECTIVE:  /65 (BP Location: Left arm, Patient Position: Sitting, Cuff Size: Adult)   Pulse 83   Temp 98.4 °F (36.9 °C) (Temporal)   Ht 180.3 cm (71\")   Wt 76.7 kg (169 lb)   SpO2 98%   BMI 23.57 kg/m²    Physical Exam  Vitals reviewed.   Constitutional:       Appearance: He is well-developed.   Cardiovascular:      Rate and Rhythm: Normal rate.   Pulmonary:      Effort: Pulmonary effort is normal.   Musculoskeletal:      Comments: Left index finger is mildly swollen and the base of the nail has dark discoloration.   Neurological:      Mental Status: He is alert and oriented to person, place, and time.   Psychiatric:         Behavior: Behavior normal.         Assessment/Plan    Diagnoses and all orders for this visit:    1. Crushing injury of left index finger, initial encounter (Primary)  -     XR Hand 3+ View Left; Future        An After Visit Summary was printed and given to the patient at discharge.  Return " if symptoms worsen or fail to improve, for Next scheduled follow up.       Kisha Cristobal, APRN 2/23/23    Electronically signed.

## 2023-02-24 ENCOUNTER — TELEPHONE (OUTPATIENT)
Dept: FAMILY MEDICINE CLINIC | Facility: CLINIC | Age: 28
End: 2023-02-24
Payer: MEDICARE

## 2023-03-09 ENCOUNTER — OFFICE VISIT (OUTPATIENT)
Dept: FAMILY MEDICINE CLINIC | Facility: CLINIC | Age: 28
End: 2023-03-09
Payer: MEDICARE

## 2023-03-09 VITALS
DIASTOLIC BLOOD PRESSURE: 80 MMHG | RESPIRATION RATE: 18 BRPM | WEIGHT: 167.6 LBS | HEIGHT: 71 IN | BODY MASS INDEX: 23.46 KG/M2 | OXYGEN SATURATION: 97 % | HEART RATE: 82 BPM | TEMPERATURE: 97.4 F | SYSTOLIC BLOOD PRESSURE: 120 MMHG

## 2023-03-09 DIAGNOSIS — F31.9 BIPOLAR AFFECTIVE DISORDER, REMISSION STATUS UNSPECIFIED: Primary | ICD-10-CM

## 2023-03-09 PROCEDURE — 1159F MED LIST DOCD IN RCRD: CPT | Performed by: FAMILY MEDICINE

## 2023-03-09 PROCEDURE — 99213 OFFICE O/P EST LOW 20 MIN: CPT | Performed by: FAMILY MEDICINE

## 2023-03-09 PROCEDURE — 1160F RVW MEDS BY RX/DR IN RCRD: CPT | Performed by: FAMILY MEDICINE

## 2023-03-09 RX ORDER — METAXALONE 800 MG/1
800 TABLET ORAL 3 TIMES DAILY
Qty: 90 TABLET | Refills: 5 | Status: SHIPPED | OUTPATIENT
Start: 2023-03-09

## 2023-03-13 ENCOUNTER — PRIOR AUTHORIZATION (OUTPATIENT)
Dept: FAMILY MEDICINE CLINIC | Facility: CLINIC | Age: 28
End: 2023-03-13
Payer: MEDICARE

## 2023-03-13 NOTE — TELEPHONE ENCOUNTER
Prior auth submitted through cover my meds for metaxalone.    Approvedtoday  The request has been approved. The authorization is effective for a maximum of 12 fills from 03/13/2023 to 03/12/2024, as long as the member is enrolled in their current health plan. The request was approved as submitted. A written notification letter will follow with additional details.

## 2023-04-19 ENCOUNTER — PRIOR AUTHORIZATION (OUTPATIENT)
Dept: FAMILY MEDICINE CLINIC | Facility: CLINIC | Age: 28
End: 2023-04-19
Payer: MEDICARE

## 2023-04-19 NOTE — TELEPHONE ENCOUNTER
Deniedtoday  The drug you asked for is not listed in your preferred drug list (formulary). The preferred drug(s), you may not have tried are: tizanidine tablet, cyclobenzaprine 5 mg tablet, carisoprodol 350 mg tablet AND cyclobenzaprine 10 mg tablet. Your provider needs to give us medical reasons why the preferred drug(s) would not work for you and/or would have bad side effects. Sometimes a preferred drug needs more review for approval. Additionally, some preferred drugs listed may be the same drugs with different strengths or forms. [facility] may only require one strength or form of that drug to be tried. This decision was from [facility] Non-Formulary Exceptions Coverage Policy.

## 2023-04-21 RX ORDER — METHOCARBAMOL 500 MG/1
500 TABLET, FILM COATED ORAL 4 TIMES DAILY PRN
Qty: 90 TABLET | Refills: 1 | Status: SHIPPED | OUTPATIENT
Start: 2023-04-21

## 2023-04-21 NOTE — TELEPHONE ENCOUNTER
Kristi perez from [facility]--med is no longer on formulary and cost is $56 with discount card.  Wanting to change meds--she mentioned flexeril or robaxin to see if either is covered. Please advise

## 2023-06-05 RX ORDER — METHOCARBAMOL 500 MG/1
TABLET, FILM COATED ORAL
Qty: 90 TABLET | Refills: 1 | Status: SHIPPED | OUTPATIENT
Start: 2023-06-05

## 2023-06-05 NOTE — TELEPHONE ENCOUNTER
Rx Refill Note  Requested Prescriptions     Pending Prescriptions Disp Refills    methocarbamol (ROBAXIN) 500 MG tablet [Pharmacy Med Name: Methocarbamol 500 MG Oral Tablet] 90 tablet 0     Sig: TAKE 1 TABLET BY MOUTH 4 TIMES DAILY AS NEEDED FOR MUSCLE SPASM      Last office visit with prescribing clinician: 3/9/2023   Last telemedicine visit with prescribing clinician: Visit date not found   Next office visit with prescribing clinician: 7/25/2023   {  Tika Johnston MA  06/05/23, 07:39 CDT

## 2023-07-26 ENCOUNTER — OFFICE VISIT (OUTPATIENT)
Dept: FAMILY MEDICINE CLINIC | Facility: CLINIC | Age: 28
End: 2023-07-26
Payer: MEDICARE

## 2023-07-26 VITALS
RESPIRATION RATE: 16 BRPM | SYSTOLIC BLOOD PRESSURE: 106 MMHG | TEMPERATURE: 98 F | DIASTOLIC BLOOD PRESSURE: 70 MMHG | BODY MASS INDEX: 23.6 KG/M2 | HEART RATE: 70 BPM | HEIGHT: 71 IN | WEIGHT: 168.6 LBS | OXYGEN SATURATION: 97 %

## 2023-07-26 DIAGNOSIS — L05.91 PILONIDAL CYST: ICD-10-CM

## 2023-07-26 DIAGNOSIS — Z13.220 LIPID SCREENING: ICD-10-CM

## 2023-07-26 DIAGNOSIS — F31.9 BIPOLAR 1 DISORDER: Primary | ICD-10-CM

## 2023-07-26 DIAGNOSIS — R53.83 FATIGUE, UNSPECIFIED TYPE: ICD-10-CM

## 2023-07-26 PROCEDURE — 99213 OFFICE O/P EST LOW 20 MIN: CPT | Performed by: NURSE PRACTITIONER

## 2023-07-26 RX ORDER — PRAMIPEXOLE DIHYDROCHLORIDE 0.5 MG/1
0.5 TABLET ORAL DAILY
COMMUNITY
Start: 2023-07-10

## 2023-07-26 NOTE — PROGRESS NOTES
CC: 6 month check - bipolar 1    History:  Jared Simpson is a 28 y.o. male who presents today for evaluation of the above problems.      Patient reports that he is doing well today.  Notes that he does need refills on his inhaler and muscle relaxer and also states that he has a cyst at the top of his tailbone.  This is ruptured in the past, however, it has returned.  He states that it is not tender at all currently.    HPI  ROS:  Review of Systems   Musculoskeletal:         Pilonidal cyst     No Known Allergies  Past Medical History:   Diagnosis Date    ADHD (attention deficit hyperactivity disorder)     Allergic     Anxiety     Arm injury     right    Depression     Headache     Wrist injury      Past Surgical History:   Procedure Laterality Date    COLONOSCOPY N/A 5/23/2017    Procedure: COLONOSCOPY WITH ANESTHESIA;  Surgeon: Janice Bradshaw MD;  Location: [facility] ENDOSCOPY;  Service:     RHINOPLASTY      TONSILLECTOMY       Family History   Problem Relation Age of Onset    No Known Problems Mother     No Known Problems Father     No Known Problems Brother     Brain cancer Maternal Grandmother     Dementia Maternal Grandfather     No Known Problems Paternal Grandmother     Pancreatic cancer Paternal Grandfather       reports that he has been smoking cigarettes. He started smoking about 14 years ago. He has a 9.75 pack-year smoking history. He has been exposed to tobacco smoke. He has never used smokeless tobacco. He reports that he does not currently use alcohol after a past usage of about 6.0 standard drinks per week. He reports that he does not currently use drugs after having used the following drugs: Marijuana.      Current Outpatient Medications:     albuterol sulfate  (90 Base) MCG/ACT inhaler, Inhale 2 puffs Every 4 (Four) Hours As Needed for Wheezing., Disp: 18 g, Rfl: 1    ALPRAZolam (XANAX) 0.25 MG tablet, Take 1 tablet by mouth 4 (Four) Times a Day., Disp: , Rfl:     Cholecalciferol (VITAMIN D)  "2000 UNITS tablet, Take 2 tablets by mouth Daily. Patient takes 4000 units daily, Disp: , Rfl:     FLUoxetine (PROzac) 20 MG capsule, Take 1 capsule by mouth Every Morning., Disp: , Rfl:     haloperidol decanoate (HALDOL DECANOATE) 50 MG/ML injection, 1 mL 1 (One) Time Per Week., Disp: , Rfl:     levETIRAcetam (KEPPRA) 250 MG tablet, 2 (Two) Times a Day., Disp: , Rfl:     Lurasidone HCl (LATUDA) 20 MG tablet tablet, Take 1 tablet by mouth Daily., Disp: , Rfl:     methocarbamol (ROBAXIN) 500 MG tablet, TAKE 1 TABLET BY MOUTH 4 TIMES DAILY AS NEEDED FOR MUSCLE SPASM, Disp: 90 tablet, Rfl: 1    pramipexole (MIRAPEX) 0.5 MG tablet, Take 1 tablet by mouth Daily., Disp: , Rfl:     propranolol (INDERAL) 10 MG tablet, Take 1 tablet by mouth 3 (Three) Times a Day., Disp: , Rfl:     SUMAtriptan (Imitrex) 100 MG tablet, Take one tablet at onset of headache. May repeat dose one time in 2 hours if headache not relieved., Disp: 9 tablet, Rfl: 2    OBJECTIVE:  /70 (BP Location: Left arm, Patient Position: Sitting, Cuff Size: Adult)   Pulse 70   Temp 98 °F (36.7 °C) (Temporal)   Resp 16   Ht 180.3 cm (71\")   Wt 76.5 kg (168 lb 9.6 oz)   SpO2 97%   BMI 23.51 kg/m²    Physical Exam  Vitals reviewed.   Constitutional:       Appearance: He is well-developed.   Cardiovascular:      Rate and Rhythm: Normal rate and regular rhythm.      Heart sounds: Normal heart sounds.   Pulmonary:      Effort: Pulmonary effort is normal.      Breath sounds: Normal breath sounds.   Musculoskeletal:        Back:    Neurological:      Mental Status: He is alert and oriented to person, place, and time.   Psychiatric:         Behavior: Behavior normal.       Assessment/Plan    Diagnoses and all orders for this visit:    1. Bipolar 1 disorder (Primary)  -     Comprehensive Metabolic Panel  -     CBC & Differential    2. Fatigue, unspecified type  -     Comprehensive Metabolic Panel  -     CBC & Differential    3. Lipid screening  -     Lipid " Panel    4. Pilonidal cyst    No intervention needed for cyst.    An After Visit Summary was printed and given to the patient at discharge.  Return in about 6 months (around 1/26/2024) for Annual physical.       Kisha HURTADO 7/26/2023    Electronically signed.

## 2023-07-27 LAB
ALBUMIN SERPL-MCNC: 4.8 G/DL (ref 3.5–5.2)
ALBUMIN/GLOB SERPL: 2.8 G/DL
ALP SERPL-CCNC: 67 U/L (ref 39–117)
ALT SERPL-CCNC: 11 U/L (ref 1–41)
AST SERPL-CCNC: 18 U/L (ref 1–40)
BASOPHILS # BLD AUTO: 0.03 10*3/MM3 (ref 0–0.2)
BASOPHILS NFR BLD AUTO: 0.3 % (ref 0–1.5)
BILIRUB SERPL-MCNC: 0.3 MG/DL (ref 0–1.2)
BUN SERPL-MCNC: 13 MG/DL (ref 6–20)
BUN/CREAT SERPL: 14.9 (ref 7–25)
CALCIUM SERPL-MCNC: 9.2 MG/DL (ref 8.6–10.5)
CHLORIDE SERPL-SCNC: 99 MMOL/L (ref 98–107)
CHOLEST SERPL-MCNC: 140 MG/DL (ref 0–200)
CO2 SERPL-SCNC: 25 MMOL/L (ref 22–29)
CREAT SERPL-MCNC: 0.87 MG/DL (ref 0.76–1.27)
EGFRCR SERPLBLD CKD-EPI 2021: 119 ML/MIN/1.73
EOSINOPHIL # BLD AUTO: 0.39 10*3/MM3 (ref 0–0.4)
EOSINOPHIL NFR BLD AUTO: 3.9 % (ref 0.3–6.2)
ERYTHROCYTE [DISTWIDTH] IN BLOOD BY AUTOMATED COUNT: 12.1 % (ref 12.3–15.4)
GLOBULIN SER CALC-MCNC: 1.7 GM/DL
GLUCOSE SERPL-MCNC: 90 MG/DL (ref 65–99)
HCT VFR BLD AUTO: 44.7 % (ref 37.5–51)
HDLC SERPL-MCNC: 47 MG/DL (ref 40–60)
HGB BLD-MCNC: 15.6 G/DL (ref 13–17.7)
IMM GRANULOCYTES # BLD AUTO: 0.05 10*3/MM3 (ref 0–0.05)
IMM GRANULOCYTES NFR BLD AUTO: 0.5 % (ref 0–0.5)
LDLC SERPL CALC-MCNC: 77 MG/DL (ref 0–100)
LYMPHOCYTES # BLD AUTO: 1.51 10*3/MM3 (ref 0.7–3.1)
LYMPHOCYTES NFR BLD AUTO: 15.2 % (ref 19.6–45.3)
MCH RBC QN AUTO: 33.5 PG (ref 26.6–33)
MCHC RBC AUTO-ENTMCNC: 34.9 G/DL (ref 31.5–35.7)
MCV RBC AUTO: 96.1 FL (ref 79–97)
MONOCYTES # BLD AUTO: 0.91 10*3/MM3 (ref 0.1–0.9)
MONOCYTES NFR BLD AUTO: 9.2 % (ref 5–12)
NEUTROPHILS # BLD AUTO: 7.05 10*3/MM3 (ref 1.7–7)
NEUTROPHILS NFR BLD AUTO: 70.9 % (ref 42.7–76)
NRBC BLD AUTO-RTO: 0 /100 WBC (ref 0–0.2)
PLATELET # BLD AUTO: 263 10*3/MM3 (ref 140–450)
POTASSIUM SERPL-SCNC: 4.4 MMOL/L (ref 3.5–5.2)
PROT SERPL-MCNC: 6.5 G/DL (ref 6–8.5)
RBC # BLD AUTO: 4.65 10*6/MM3 (ref 4.14–5.8)
SODIUM SERPL-SCNC: 136 MMOL/L (ref 136–145)
TRIGL SERPL-MCNC: 81 MG/DL (ref 0–150)
VLDLC SERPL CALC-MCNC: 16 MG/DL (ref 5–40)
WBC # BLD AUTO: 9.94 10*3/MM3 (ref 3.4–10.8)

## 2023-10-09 ENCOUNTER — TELEPHONE (OUTPATIENT)
Dept: FAMILY MEDICINE CLINIC | Facility: CLINIC | Age: 28
End: 2023-10-09
Payer: MEDICARE

## 2023-10-09 RX ORDER — METAXALONE 800 MG/1
800 TABLET ORAL 3 TIMES DAILY PRN
Qty: 90 TABLET | Refills: 0 | Status: SHIPPED | OUTPATIENT
Start: 2023-10-09

## 2023-10-09 NOTE — TELEPHONE ENCOUNTER
Pt is requesting med refill for metaxalone 800mg (1) tid.  Had been using robaxin but it gives him a headache.      [facility]

## 2023-10-09 NOTE — TELEPHONE ENCOUNTER
Rx Refill Note  Requested Prescriptions     Pending Prescriptions Disp Refills    metaxalone (Skelaxin) 800 MG tablet 90 tablet 0     Sig: Take 1 tablet by mouth 3 (Three) Times a Day As Needed for Muscle Spasms.      Last office visit with prescribing clinician: 3/9/2023   Last telemedicine visit with prescribing clinician: Visit date not found   Next office visit with prescribing clinician: 1/26/2024                         Would you like a call back once the refill request has been completed: [] Yes [] No    If the office needs to give you a call back, can they leave a voicemail: [] Yes [] No    Hi Ortiz Rep  10/09/23, 14:59 CDT

## 2023-11-29 ENCOUNTER — PRIOR AUTHORIZATION (OUTPATIENT)
Dept: FAMILY MEDICINE CLINIC | Facility: CLINIC | Age: 28
End: 2023-11-29
Payer: MEDICARE

## 2023-11-29 RX ORDER — METAXALONE 800 MG/1
800 TABLET ORAL 3 TIMES DAILY PRN
Qty: 90 TABLET | Refills: 0 | Status: SHIPPED | OUTPATIENT
Start: 2023-11-29

## 2023-11-29 NOTE — TELEPHONE ENCOUNTER
Rx Refill Note  Requested Prescriptions     Pending Prescriptions Disp Refills    metaxalone (Skelaxin) 800 MG tablet 90 tablet 0     Sig: Take 1 tablet by mouth 3 (Three) Times a Day As Needed for Muscle Spasms.      Last office visit with prescribing clinician: 3/9/2023   Last telemedicine visit with prescribing clinician: Visit date not found   Next office visit with prescribing clinician: 1/26/2024                         Would you like a call back once the refill request has been completed: [] Yes [] No    If the office needs to give you a call back, can they leave a voicemail: [] Yes [] No    Hi Ortiz Rep  11/29/23, 08:43 CST

## 2023-11-29 NOTE — TELEPHONE ENCOUNTER
Deniedtoday  The drug you asked for is not listed in your preferred drug list (formulary). The preferred drug(s), you may not have tried, are: carisoprodol 350 mg tablet, cyclobenzaprine 10 mg tablet, cyclobenzaprine 5 mg tablet, and tizanidine tablet. Your provider needs to give us medical reasons why the preferred drug(s) would not work for you and/or would have bad side effects. Sometimes a preferred drug needs more review for approval. Additionally, some preferred drugs listed may be the same drugs with different strengths or forms. [employer] may only require one strength or form of that drug to be tried. This decision was from [employer] Non-Formulary Exceptions Coverage Policy.

## 2023-12-05 RX ORDER — METAXALONE 800 MG/1
800 TABLET ORAL 3 TIMES DAILY PRN
Qty: 90 TABLET | Refills: 0 | Status: SHIPPED | OUTPATIENT
Start: 2023-12-05

## 2023-12-05 NOTE — TELEPHONE ENCOUNTER
Caller: Jared Simpson    Relationship: Self    Best call back number: [phone]     Requested Prescriptions:   Requested Prescriptions     Pending Prescriptions Disp Refills    metaxalone (Skelaxin) 800 MG tablet 90 tablet 0     Sig: Take 1 tablet by mouth 3 (Three) Times a Day As Needed for Muscle Spasms.        Pharmacy where request should be sent: [facility] [address] [place] [address] [address] [place] - [phone] [facility] [phone] FX     Last office visit with prescribing clinician: 3/9/2023   Last telemedicine visit with prescribing clinician: Visit date not found   Next office visit with prescribing clinician: 1/26/2024     Additional details provided by patient: COMPLETELY OUT     Does the patient have less than a 3 day supply:  [x] Yes  [] No    Would you like a call back once the refill request has been completed: [] Yes [] No    If the office needs to give you a call back, can they leave a voicemail: [] Yes [] No    Hi Helm Rep   12/05/23 09:39 CST

## 2023-12-21 NOTE — PROGRESS NOTES
Preventive Health Recommendations  Female Ages 50 - 64    Yearly exam: See your health care provider every year in order to  Review health changes.   Discuss preventive care.    Review your medicines if your doctor has prescribed any.    Get a Pap test every three years (unless you have an abnormal result and your provider advises testing more often).  If you get Pap tests with HPV test, you only need to test every 5 years, unless you have an abnormal result.   You do not need a Pap test if your uterus was removed (hysterectomy) and you have not had cancer.  You should be tested each year for STDs (sexually transmitted diseases) if you're at risk.   Have a mammogram every 1 to 2 years.  Have a colonoscopy at age 45, or have a yearly FIT test (stool test). These exams screen for colon cancer.    Have a cholesterol test every 5 years, or more often if advised.  Have a diabetes test (fasting glucose) every three years. If you are at risk for diabetes, you should have this test more often.   If you are at risk for osteoporosis (brittle bone disease), think about having a bone density scan (DEXA).    Shots: Get a flu shot each year. Get a tetanus shot every 10 years.    Nutrition:   Eat at least 5 servings of fruits and vegetables each day.  Eat whole-grain bread, whole-wheat pasta and brown rice instead of white grains and rice.  Get adequate Calcium and Vitamin D.     Lifestyle  Exercise at least 150 minutes a week (30 minutes a day, 5 days a week). This will help you control your weight and prevent disease.  Limit alcohol to one drink per day.  No smoking.   Wear sunscreen to prevent skin cancer.   See your dentist every six months for an exam and cleaning.  See your eye doctor every 1 to 2 years.     Subjective   Jared Simpson is a 23 y.o. male.     Rash   This is a new problem. The current episode started in the past 7 days. The problem has been waxing and waning since onset. Location: Pubic. The rash is characterized by blistering, burning and redness. Associated with: No sexual contacts in years. Past treatments include nothing.       The following portions of the patient's history were reviewed and updated as appropriate: allergies, current medications, past family history, past medical history, past social history, past surgical history and problem list.    Review of Systems   Genitourinary: Positive for genital sores. Negative for discharge and penile pain.   Skin: Positive for rash.       Objective   Physical Exam   Constitutional: He is oriented to person, place, and time. He appears well-developed and well-nourished.   Neurological: He is alert and oriented to person, place, and time.   Skin:       Consistent with genital herpes-as above plus information given-see one week-denies contacts in years   Psychiatric: He has a normal mood and affect. His behavior is normal. Judgment and thought content normal.   Nursing note and vitals reviewed.      Assessment/Plan   Jared was seen today for genital warts.    Diagnoses and all orders for this visit:    Rash    Other orders  -     valACYclovir (VALTREX) 1000 MG tablet; Take 1 tablet by mouth 3 (Three) Times a Day.         See above

## 2024-01-14 DIAGNOSIS — G43.009 MIGRAINE WITHOUT AURA AND WITHOUT STATUS MIGRAINOSUS, NOT INTRACTABLE: ICD-10-CM

## 2024-01-15 RX ORDER — SUMATRIPTAN 100 MG/1
TABLET, FILM COATED ORAL
Qty: 9 TABLET | Refills: 2 | Status: SHIPPED | OUTPATIENT
Start: 2024-01-15

## 2024-02-06 ENCOUNTER — OFFICE VISIT (OUTPATIENT)
Dept: FAMILY MEDICINE CLINIC | Facility: CLINIC | Age: 29
End: 2024-02-06
Payer: MEDICARE

## 2024-02-06 VITALS
HEART RATE: 78 BPM | SYSTOLIC BLOOD PRESSURE: 120 MMHG | HEIGHT: 71 IN | OXYGEN SATURATION: 99 % | DIASTOLIC BLOOD PRESSURE: 70 MMHG | BODY MASS INDEX: 24.92 KG/M2 | TEMPERATURE: 97.5 F | WEIGHT: 178 LBS | RESPIRATION RATE: 16 BRPM

## 2024-02-06 DIAGNOSIS — M54.89 MIDLINE BACK PAIN, UNSPECIFIED BACK LOCATION, UNSPECIFIED CHRONICITY: ICD-10-CM

## 2024-02-06 DIAGNOSIS — R53.83 FATIGUE, UNSPECIFIED TYPE: ICD-10-CM

## 2024-02-06 DIAGNOSIS — E78.2 MIXED HYPERLIPIDEMIA: Primary | ICD-10-CM

## 2024-02-06 DIAGNOSIS — R53.83 OTHER FATIGUE: ICD-10-CM

## 2024-02-06 LAB
BILIRUB BLD-MCNC: NEGATIVE MG/DL
CLARITY, POC: CLEAR
COLOR UR: YELLOW
GLUCOSE UR STRIP-MCNC: NEGATIVE MG/DL
KETONES UR QL: NEGATIVE
LEUKOCYTE EST, POC: NEGATIVE
NITRITE UR-MCNC: NEGATIVE MG/ML
PH UR: 6.5 [PH] (ref 5–8)
PROT UR STRIP-MCNC: NEGATIVE MG/DL
RBC # UR STRIP: NEGATIVE /UL
SP GR UR: 1.01 (ref 1–1.03)
UROBILINOGEN UR QL: NORMAL

## 2024-02-06 RX ORDER — LORAZEPAM 0.5 MG/1
1 TABLET ORAL EVERY 12 HOURS SCHEDULED
COMMUNITY
Start: 2024-01-25

## 2024-02-06 RX ORDER — DOXYCYCLINE 100 MG/1
TABLET ORAL
Qty: 40 TABLET | Refills: 0 | Status: SHIPPED | OUTPATIENT
Start: 2024-02-06

## 2024-02-06 RX ORDER — HYDROXYZINE PAMOATE 25 MG/1
25 CAPSULE ORAL DAILY
COMMUNITY

## 2024-02-06 NOTE — PROGRESS NOTES
The ABCs of the Annual Wellness Visit  Subsequent Medicare Wellness Visit    Subjective        Jared Simpson is a 28 y.o. male who presents for a Subsequent Medicare Wellness Visit.    The following portions of the patient's history were reviewed and   updated as appropriate: allergies, current medications, past family history, past medical history, past social history, past surgical history, and problem list.    Review of Systems   Constitutional:  Positive for fatigue.   HENT:  Positive for postnasal drip.    Respiratory:  Positive for cough. Negative for shortness of breath.    Cardiovascular:  Negative for chest pain.   Skin:         Skin cyst-mid back in left gluteal cheek   Psychiatric/Behavioral:  The patient is nervous/anxious.         Sees psychiatry regularly and manages his medicines   All other systems reviewed and are negative.       Compared to one year ago, the patient feels his physical   health is the same.    Compared to one year ago, the patient feels his mental   health is the same.    Recent Hospitalizations:  He was not admitted to the hospital during the last year.       Current Medical Providers:  Patient Care Team:  Morris Espitia MD as PCP - General (Family Medicine)    Outpatient Medications Prior to Visit   Medication Sig Dispense Refill    albuterol sulfate  (90 Base) MCG/ACT inhaler Inhale 2 puffs Every 4 (Four) Hours As Needed for Wheezing. 18 g 1    Cholecalciferol (VITAMIN D) 2000 UNITS tablet Take 2 tablets by mouth Daily. Patient takes 4000 units daily      FLUoxetine (PROzac) 20 MG capsule Take 1 capsule by mouth Every Morning.      haloperidol decanoate (HALDOL DECANOATE) 50 MG/ML injection 2 mL 1 (One) Time Per Week.      hydrOXYzine pamoate (Vistaril) 25 MG capsule Take 1 capsule by mouth Daily.      levETIRAcetam (KEPPRA) 250 MG tablet 2 (Two) Times a Day.      LORazepam (ATIVAN) 0.5 MG tablet Take 1 tablet by mouth Every 12 (Twelve) Hours.      Lurasidone HCl  "(LATUDA) 20 MG tablet tablet Take 1 tablet by mouth Daily.      metaxalone (Skelaxin) 800 MG tablet Take 1 tablet by mouth 3 (Three) Times a Day As Needed for Muscle Spasms. 90 tablet 0    propranolol (INDERAL) 10 MG tablet Take 1 tablet by mouth 2 (Two) Times a Day.      SUMAtriptan (IMITREX) 100 MG tablet TAKE ONE TABLET AT ONSET OF HEADACHE. MAY REPEAT DOSE ONE TIME IN 2 HOURS IF HEADACHE NOT RELIEVED. 9 tablet 2    ALPRAZolam (XANAX) 0.25 MG tablet Take 1 tablet by mouth 4 (Four) Times a Day.      pramipexole (MIRAPEX) 0.5 MG tablet Take 1 tablet by mouth Daily.       No facility-administered medications prior to visit.       No opioid medication identified on active medication list. I have reviewed chart for other potential  high risk medication/s and harmful drug interactions in the elderly.        Aspirin is not on active medication list.  Aspirin use is not indicated based on review of current medical condition/s. Risk of harm outweighs potential benefits.  .    Patient Active Problem List   Diagnosis    Bipolar 1 disorder    Diarrhea    Abdominal cramping    Family history of colon cancer     Advance Care Planning  Advance Directive is not on file.  ACP discussion was declined by the patient. Patient does not have an advance directive, declines further assistance.     Objective    Vitals:    02/06/24 1535   BP: 120/70   BP Location: Left arm   Patient Position: Sitting   Cuff Size: Adult   Pulse: 78   Resp: 16   Temp: 97.5 °F (36.4 °C)   TempSrc: Temporal   SpO2: 99%   Weight: 80.7 kg (178 lb)   Height: 180.3 cm (71\")   PainSc: 0-No pain     Estimated body mass index is 24.83 kg/m² as calculated from the following:    Height as of this encounter: 180.3 cm (71\").    Weight as of this encounter: 80.7 kg (178 lb).    BMI is within normal parameters. No other follow-up for BMI required.      Physical Exam  Vitals and nursing note reviewed.   Constitutional:       Appearance: Normal appearance.   HENT:      " Mouth/Throat:      Mouth: Mucous membranes are moist.      Pharynx: Oropharynx is clear.   Eyes:      Extraocular Movements: Extraocular movements intact.      Pupils: Pupils are equal, round, and reactive to light.   Neck:      Vascular: No carotid bruit.   Cardiovascular:      Rate and Rhythm: Normal rate and regular rhythm.      Pulses: Normal pulses.      Heart sounds: Normal heart sounds.   Pulmonary:      Effort: Pulmonary effort is normal.      Breath sounds: Rhonchi present. No wheezing.   Abdominal:      General: Abdomen is flat.      Palpations: Abdomen is soft.   Musculoskeletal:      Right lower leg: No edema.      Left lower leg: No edema.   Lymphadenopathy:      Cervical: No cervical adenopathy.   Skin:     General: Skin is warm and dry.      Comments: Left gluteal cyst-do not believe it is a pilonidal cyst-mid back cyst   Neurological:      General: No focal deficit present.      Mental Status: He is alert and oriented to person, place, and time.   Psychiatric:         Behavior: Behavior normal.         Does the patient have evidence of cognitive impairment?   No            HEALTH RISK ASSESSMENT    Smoking Status:  Social History     Tobacco Use   Smoking Status Every Day    Packs/day: 0.75    Years: 13.00    Additional pack years: 0.00    Total pack years: 9.75    Types: Cigarettes    Start date:     Passive exposure: Current   Smokeless Tobacco Never       Alcohol Consumption:  Social History     Substance and Sexual Activity   Alcohol Use Not Currently    Alcohol/week: 6.0 standard drinks of alcohol    Types: 6 Cans of beer per week    Comment: oc.       Fall Risk Screen:    STEADI Fall Risk Assessment was completed, and patient is at LOW risk for falls.Assessment completed on:2024    Depression Screenin/6/2024     3:40 PM   PHQ-2/PHQ-9 Depression Screening   Little Interest or Pleasure in Doing Things 0-->not at all   Feeling Down, Depressed or Hopeless 0-->not at all   PHQ-9:  Brief Depression Severity Measure Score 0       Health Habits and Functional and Cognitive Screenin/6/2024     3:41 PM   Functional & Cognitive Status   Do you have difficulty preparing food and eating? No   Do you have difficulty bathing yourself, getting dressed or grooming yourself? No   Do you have difficulty using the toilet? No   Do you have difficulty moving around from place to place? No   Do you have trouble with steps or getting out of a bed or a chair? No   Current Diet Well Balanced Diet   Dental Exam Up to date   Eye Exam Up to date   Exercise (times per week) 0 times per week   Current Exercises Include No Regular Exercise   Do you need help using the phone?  No   Are you deaf or do you have serious difficulty hearing?  No   Do you need help to go to places out of walking distance? No   Do you need help shopping? No   Do you need help preparing meals?  No   Do you need help with housework?  No   Do you need help with laundry? No   Do you need help taking your medications? No   Do you need help managing money? No   Do you ever drive or ride in a car without wearing a seat belt? No   Have you felt unusual stress, anger or loneliness in the last month? No   Who do you live with? Other   If you need help, do you have trouble finding someone available to you? No   Have you been bothered in the last four weeks by sexual problems? No   Do you have difficulty concentrating, remembering or making decisions? No       Age-appropriate Screening Schedule:  Refer to the list below for future screening recommendations based on patient's age, sex and/or medical conditions. Orders for these recommended tests are listed in the plan section. The patient has been provided with a written plan.    Health Maintenance   Topic Date Due    Pneumococcal Vaccine 0-64 (1 of 2 - PCV) Never done    TDAP/TD VACCINES (1 - Tdap) Never done    INFLUENZA VACCINE  2023    COVID-19 Vaccine (2023-24 season) 2023     LIPID PANEL  07/26/2024    ANNUAL WELLNESS VISIT  02/06/2025    HEPATITIS C SCREENING  Completed            CMS Preventative Services Quick Reference  Risk Factors Identified During Encounter:    Depression/Dysphoria: Current medication is effective, no change recommended    The above risks/problems have been discussed with the patient.  Pertinent information has been shared with the patient in the After Visit Summary.    Diagnoses and all orders for this visit:    1. Mixed hyperlipidemia (Primary)  -     Comprehensive Metabolic Panel  -     Lipid Panel    2. Fatigue, unspecified type    3. Other fatigue  -     TSH  -     T4, free  -     CBC & Differential    4. Midline back pain, unspecified back location, unspecified chronicity  -     POC Urinalysis Dipstick, Multipro    Other orders  -     doxycycline (ADOXA) 100 MG tablet; 1 twice daily for 10 days then 1 daily till medicine go  Dispense: 40 tablet; Refill: 0        Follow Up:   Next Medicare Wellness visit to be scheduled in 1 year.      An After Visit Summary and PPPS were made available to the patient.    Trino Espitia MD

## 2024-02-08 LAB
ALBUMIN SERPL-MCNC: 4.9 G/DL (ref 3.5–5.2)
ALBUMIN/GLOB SERPL: 2.7 G/DL
ALP SERPL-CCNC: 77 U/L (ref 39–117)
ALT SERPL-CCNC: 8 U/L (ref 1–41)
AST SERPL-CCNC: 11 U/L (ref 1–40)
BASOPHILS # BLD AUTO: 0.03 10*3/MM3 (ref 0–0.2)
BASOPHILS NFR BLD AUTO: 0.4 % (ref 0–1.5)
BILIRUB SERPL-MCNC: 0.4 MG/DL (ref 0–1.2)
BUN SERPL-MCNC: 7 MG/DL (ref 6–20)
BUN/CREAT SERPL: 8 (ref 7–25)
CALCIUM SERPL-MCNC: 9.2 MG/DL (ref 8.6–10.5)
CHLORIDE SERPL-SCNC: 98 MMOL/L (ref 98–107)
CHOLEST SERPL-MCNC: 164 MG/DL (ref 0–200)
CO2 SERPL-SCNC: 24.8 MMOL/L (ref 22–29)
CREAT SERPL-MCNC: 0.88 MG/DL (ref 0.76–1.27)
EGFRCR SERPLBLD CKD-EPI 2021: 118.6 ML/MIN/1.73
EOSINOPHIL # BLD AUTO: 0.19 10*3/MM3 (ref 0–0.4)
EOSINOPHIL NFR BLD AUTO: 2.2 % (ref 0.3–6.2)
ERYTHROCYTE [DISTWIDTH] IN BLOOD BY AUTOMATED COUNT: 11.9 % (ref 12.3–15.4)
GLOBULIN SER CALC-MCNC: 1.8 GM/DL
GLUCOSE SERPL-MCNC: 93 MG/DL (ref 65–99)
HCT VFR BLD AUTO: 46.9 % (ref 37.5–51)
HDLC SERPL-MCNC: 45 MG/DL (ref 40–60)
HGB BLD-MCNC: 15.7 G/DL (ref 13–17.7)
IMM GRANULOCYTES # BLD AUTO: 0.02 10*3/MM3 (ref 0–0.05)
IMM GRANULOCYTES NFR BLD AUTO: 0.2 % (ref 0–0.5)
LDLC SERPL CALC-MCNC: 91 MG/DL (ref 0–100)
LYMPHOCYTES # BLD AUTO: 1.85 10*3/MM3 (ref 0.7–3.1)
LYMPHOCYTES NFR BLD AUTO: 21.6 % (ref 19.6–45.3)
MCH RBC QN AUTO: 32.6 PG (ref 26.6–33)
MCHC RBC AUTO-ENTMCNC: 33.5 G/DL (ref 31.5–35.7)
MCV RBC AUTO: 97.3 FL (ref 79–97)
MONOCYTES # BLD AUTO: 0.81 10*3/MM3 (ref 0.1–0.9)
MONOCYTES NFR BLD AUTO: 9.5 % (ref 5–12)
NEUTROPHILS # BLD AUTO: 5.67 10*3/MM3 (ref 1.7–7)
NEUTROPHILS NFR BLD AUTO: 66.1 % (ref 42.7–76)
NRBC BLD AUTO-RTO: 0 /100 WBC (ref 0–0.2)
PLATELET # BLD AUTO: 319 10*3/MM3 (ref 140–450)
POTASSIUM SERPL-SCNC: 4.6 MMOL/L (ref 3.5–5.2)
PROT SERPL-MCNC: 6.7 G/DL (ref 6–8.5)
RBC # BLD AUTO: 4.82 10*6/MM3 (ref 4.14–5.8)
SODIUM SERPL-SCNC: 136 MMOL/L (ref 136–145)
T4 FREE SERPL-MCNC: 1.03 NG/DL (ref 0.93–1.7)
TRIGL SERPL-MCNC: 159 MG/DL (ref 0–150)
TSH SERPL DL<=0.005 MIU/L-ACNC: 0.68 UIU/ML (ref 0.27–4.2)
VLDLC SERPL CALC-MCNC: 28 MG/DL (ref 5–40)
WBC # BLD AUTO: 8.57 10*3/MM3 (ref 3.4–10.8)

## 2024-02-26 RX ORDER — METAXALONE 800 MG/1
800 TABLET ORAL 3 TIMES DAILY PRN
Qty: 90 TABLET | Refills: 0 | Status: SHIPPED | OUTPATIENT
Start: 2024-02-26

## 2024-02-26 NOTE — TELEPHONE ENCOUNTER
Caller: Jared Simpson    Relationship: Self    Best call back number: [phone]     Requested Prescriptions:   Requested Prescriptions     Pending Prescriptions Disp Refills    metaxalone (Skelaxin) 800 MG tablet 90 tablet 0     Sig: Take 1 tablet by mouth 3 (Three) Times a Day As Needed for Muscle Spasms.        Pharmacy where request should be sent: [facility] [address] [place] [address] [address] [place] - [phone] [facility] [phone] FX     Last office visit with prescribing clinician: 2/6/2024   Last telemedicine visit with prescribing clinician: Visit date not found   Next office visit with prescribing clinician: 8/6/2024     Additional details provided by patient:     Does the patient have less than a 3 day supply:  [x] Yes  [] No    Would you like a call back once the refill request has been completed: [x] Yes [] No      Hi Ramirez   02/26/24 12:00 CST

## 2024-02-26 NOTE — TELEPHONE ENCOUNTER
Rx Refill Note  Requested Prescriptions     Pending Prescriptions Disp Refills    metaxalone (Skelaxin) 800 MG tablet 90 tablet 0     Sig: Take 1 tablet by mouth 3 (Three) Times a Day As Needed for Muscle Spasms.      Last office visit with prescribing clinician: 2/6/2024   Last telemedicine visit with prescribing clinician: Visit date not found   Next office visit with prescribing clinician: 8/6/2024       Ade Murillo MA  02/26/24, 12:29 CST

## 2024-03-04 ENCOUNTER — TELEPHONE (OUTPATIENT)
Dept: FAMILY MEDICINE CLINIC | Facility: CLINIC | Age: 29
End: 2024-03-04

## 2024-03-04 ENCOUNTER — TELEMEDICINE (OUTPATIENT)
Dept: FAMILY MEDICINE CLINIC | Facility: TELEHEALTH | Age: 29
End: 2024-03-04
Payer: MEDICARE

## 2024-03-04 DIAGNOSIS — L23.9 ALLERGIC CONTACT DERMATITIS, UNSPECIFIED TRIGGER: Primary | ICD-10-CM

## 2024-03-04 PROCEDURE — 99213 OFFICE O/P EST LOW 20 MIN: CPT | Performed by: NURSE PRACTITIONER

## 2024-03-04 RX ORDER — MIRTAZAPINE 7.5 MG/1
TABLET, FILM COATED ORAL
COMMUNITY
Start: 2024-02-23

## 2024-03-04 RX ORDER — METHYLPREDNISOLONE 4 MG/1
TABLET ORAL
Qty: 1 EACH | Refills: 0 | Status: SHIPPED | OUTPATIENT
Start: 2024-03-04

## 2024-03-04 RX ORDER — HALOPERIDOL DECANOATE 100 MG/ML
INJECTION INTRAMUSCULAR
COMMUNITY
Start: 2024-02-23

## 2024-03-04 RX ORDER — ALPRAZOLAM 0.25 MG/1
TABLET ORAL
COMMUNITY
Start: 2024-02-23

## 2024-03-04 NOTE — PROGRESS NOTES
Chief Complaint   Patient presents with    Rash       Video Visit Reason:   Free Text Description: Just verification of rash  Subjective   Jared Simpson is a 28 y.o. male.     History of Present Illness  Rash that has developed over the last 2-3 days on his chest, abdomen and arms. He had exposure to fertilizer and has changed his detergent. He has not taken anything for the rash. He says that the rash is itchy, bumpy and it is making him irritable. He called his PCP and they told him to go to the ED but he is not having any difficulty with breathing, swallowing or hives. He sees a psychiatrist and has hydroxyzine that he can take up to 3 times daily for reactions to his haldol but he has not taken it.   Rash  This is a new problem. Episode onset: 3 days. The problem has been worse since onset. The affected locations include the abdomen, chest, left arm and right arm. The rash is characterized by burning, itchiness and redness. Pertinent negatives include no cough, fatigue, fever, rhinorrhea, shortness of breath or sore throat. Past treatments include nothing.       The following portions of the patient's history were reviewed and updated as appropriate: allergies, current medications, past medical history, and problem list.      Past Medical History:   Diagnosis Date    ADHD (attention deficit hyperactivity disorder)     Allergic     Anxiety     Arm injury     right    Depression     Headache     Wrist injury      Social History     Socioeconomic History    Marital status: Single   Tobacco Use    Smoking status: Every Day     Current packs/day: 0.75     Average packs/day: 0.8 packs/day for 15.2 years (11.4 ttl pk-yrs)     Types: Cigarettes     Start date: 2009     Passive exposure: Current    Smokeless tobacco: Never   Vaping Use    Vaping status: Never Used   Substance and Sexual Activity    Alcohol use: Not Currently     Alcohol/week: 6.0 standard drinks of alcohol     Types: 6 Cans of beer per week     Comment:  oc.    Drug use: Not Currently     Types: Marijuana     Comment: former    Sexual activity: Defer     medication documentation: reviewed and updated with patient and   Current Outpatient Medications:     ALPRAZolam (XANAX) 0.25 MG tablet, 1 TABLET THREE TIMES PER DAY, Disp: , Rfl:     FLUoxetine (PROzac) 20 MG capsule, Take 1 capsule by mouth Every Morning., Disp: , Rfl:     haloperidol decanoate (HALDOL DECANOATE) 100 MG/ML injection, 100 MG/ML INJECTION EVERY WEEK, Disp: , Rfl:     hydrOXYzine pamoate (Vistaril) 25 MG capsule, Take 1 capsule by mouth Daily., Disp: , Rfl:     levETIRAcetam (KEPPRA) 250 MG tablet, 2 (Two) Times a Day., Disp: , Rfl:     Lurasidone HCl (LATUDA) 20 MG tablet tablet, Take 1 tablet by mouth Daily., Disp: , Rfl:     mirtazapine (REMERON) 7.5 MG tablet, 1 OR 2 AT BEDTIME, Disp: , Rfl:     propranolol (INDERAL) 10 MG tablet, Take 1 tablet by mouth 2 (Two) Times a Day., Disp: , Rfl:     SUMAtriptan (IMITREX) 100 MG tablet, TAKE ONE TABLET AT ONSET OF HEADACHE. MAY REPEAT DOSE ONE TIME IN 2 HOURS IF HEADACHE NOT RELIEVED., Disp: 9 tablet, Rfl: 2    albuterol sulfate  (90 Base) MCG/ACT inhaler, Inhale 2 puffs Every 4 (Four) Hours As Needed for Wheezing., Disp: 18 g, Rfl: 1    Cholecalciferol (VITAMIN D) 2000 UNITS tablet, Take 2 tablets by mouth Daily. Patient takes 4000 units daily, Disp: , Rfl:     haloperidol decanoate (HALDOL DECANOATE) 50 MG/ML injection, 2 mL 1 (One) Time Per Week., Disp: , Rfl:     metaxalone (Skelaxin) 800 MG tablet, Take 1 tablet by mouth 3 (Three) Times a Day As Needed for Muscle Spasms., Disp: 90 tablet, Rfl: 0    methylPREDNISolone (MEDROL) 4 MG dose pack, Take as directed on package instructions., Disp: 1 each, Rfl: 0    pramipexole (MIRAPEX) 0.5 MG tablet, Take 1 tablet by mouth Daily., Disp: , Rfl:   Review of Systems   Constitutional:  Negative for fatigue and fever.   HENT:  Negative for rhinorrhea and sore throat.    Respiratory:  Negative for cough  and shortness of breath.    Skin:  Positive for rash.       Objective   Physical Exam  Constitutional:       General: He is not in acute distress.     Appearance: He is not ill-appearing or diaphoretic.   Eyes:      Conjunctiva/sclera: Conjunctivae normal.   Pulmonary:      Effort: Pulmonary effort is normal.   Skin:     Findings: Rash (on abdomen, mildly erythematous, diffuse rash on chest and abdomen eczema-like, may be allergic or contact) present.   Neurological:      Mental Status: He is alert.         Assessment & Plan   Diagnoses and all orders for this visit:    1. Allergic contact dermatitis, unspecified trigger (Primary)  -     methylPREDNISolone (MEDROL) 4 MG dose pack; Take as directed on package instructions.  Dispense: 1 each; Refill: 0                    Follow Up:  If your symptoms are not resolving by the completion of your treatment or are worsening, see your primary care provider for follow up. If you don't have a primary care provider, you may go to any Urgent Care for re-evaluation. If you develop any life threatening symptoms, go to the nearest Emergency Department immediately or call EMS.               The use of  Video Visit was utilized during this visit, using both [employer]node and [employer]. The use of a video visit has been reviewed with the patient and verbal informed consent has been obtained. No technical difficulties occurred during the visit.    is located at [id number] [facility] [address] [facility] [address]  Provider is located at [place], [place]

## 2024-03-04 NOTE — PATIENT INSTRUCTIONS
Contact Dermatitis  Dermatitis is when your skin becomes red, sore, and swollen.   Contact dermatitis happens when your body reacts to something that touches the skin. There are 2 types:  Irritant contact dermatitis. This is when something bothers your skin, like soap.  Allergic contact dermatitis. This is when your skin touches something you are allergic to, like poison ivy.  What are the causes?  Irritant contact dermatitis may be caused by:  Makeup.  Soaps.  Detergents.  Bleaches.  Acids.  Metals, like nickel.  Allergic contact dermatitis may be caused by:  Plants.  Chemicals.  Jewelry.  Latex.  Medicines.  Preservatives. These are things added to products to help them last longer. There may be some in your clothes.  What increases the risk?  Having a job where you have to be near things that bother your skin.  Having asthma or eczema.  What are the signs or symptoms?    Dry or flaky skin.  Redness.  Cracks.  Itching.  Moderate symptoms of this condition include:  Pain or a burning feeling.  Blisters.  Blood or clear fluid coming from cracks in your skin.  Swelling. This may be on your eyelids, mouth, or genitals.  How is this treated?  Your doctor will find out what is making your skin react. Then, you can protect your skin. You may need to use:  Steroid creams, ointments, or medicines.  Antibiotics or other ointments, if you have a skin infection.  Lotion or medicines to help with itching.  A bandage.  Follow these instructions at home:  Skin care  Put moisturizer on your skin when it needs it.  Put cool, wet cloths on your skin (cool compresses).  Put a baking soda paste on your skin. Stir water into baking soda until it looks like a paste.  Do not scratch your skin.  Try not to have things rub up against your skin. Avoid tight clothing.  Avoid using soaps, perfumes, and dyes.  Check your skin every day for signs of infection. Check for:  More redness, swelling, or pain.  More fluid or blood.  Warmth.  Pus or  a bad smell.  Medicines  Take or apply over-the-counter and prescription medicines only as told by your doctor.  If you were prescribed antibiotics, take or apply them as told by your doctor. Do not stop using them even if you start to feel better.  Bathing  Take a bath with:  Epsom salts.  Baking soda.  Colloidal oatmeal.  Bathe less often.  Bathe in warm water. Try not to use hot water.  Bandage care  If you were given a bandage, change it as told by your doctor.  Wash your hands with soap and water for at least 20 seconds before and after you change your bandage. If you cannot use soap and water, use hand .  General instructions  Avoid the things that caused your reaction. If you don't know what caused it, keep a journal. Write down:  What you eat.  What skin products you use.  What you drink.  What you wear.  Contact a doctor if:  You do not get better with treatment.  You get worse.  You have signs of infection.  You have a fever.  You have new symptoms.  Your bone or joint near the area hurts after the skin has healed.  Get help right away if:  You see red streaks coming from the area.  The area turns darker.  You have trouble breathing.  This information is not intended to replace advice given to you by your health care provider. Make sure you discuss any questions you have with your health care provider.  Document Revised: 06/23/2023 Document Reviewed: 06/23/2023  [employer] Patient Education © 2023 [employer]

## 2024-03-04 NOTE — TELEPHONE ENCOUNTER
Caller:     Jared Simpson        Relationship to patient: SELF     Best call back number:     [phone]        Chief complaint: HE STATES HIVES THAT ITCHES AND STATES CHEMICAL BURN     Patient directed to call 911 or go to their nearest emergency room.     Patient verbalized understanding: [x] Yes  [] No  If no, why?    Additional notes NONE

## 2024-08-06 ENCOUNTER — OFFICE VISIT (OUTPATIENT)
Dept: FAMILY MEDICINE CLINIC | Facility: CLINIC | Age: 29
End: 2024-08-06
Payer: MEDICARE

## 2024-08-06 VITALS
RESPIRATION RATE: 18 BRPM | BODY MASS INDEX: 27.35 KG/M2 | DIASTOLIC BLOOD PRESSURE: 84 MMHG | SYSTOLIC BLOOD PRESSURE: 122 MMHG | HEIGHT: 71 IN | HEART RATE: 97 BPM | OXYGEN SATURATION: 95 % | TEMPERATURE: 97 F | WEIGHT: 195.4 LBS

## 2024-08-06 DIAGNOSIS — L72.3 SEBACEOUS CYST: ICD-10-CM

## 2024-08-06 DIAGNOSIS — R06.02 SHORTNESS OF BREATH: ICD-10-CM

## 2024-08-06 DIAGNOSIS — E78.2 MIXED HYPERLIPIDEMIA: Primary | ICD-10-CM

## 2024-08-06 DIAGNOSIS — G43.009 MIGRAINE WITHOUT AURA AND WITHOUT STATUS MIGRAINOSUS, NOT INTRACTABLE: ICD-10-CM

## 2024-08-06 PROCEDURE — 1159F MED LIST DOCD IN RCRD: CPT | Performed by: FAMILY MEDICINE

## 2024-08-06 PROCEDURE — 99214 OFFICE O/P EST MOD 30 MIN: CPT | Performed by: FAMILY MEDICINE

## 2024-08-06 PROCEDURE — 1160F RVW MEDS BY RX/DR IN RCRD: CPT | Performed by: FAMILY MEDICINE

## 2024-08-06 PROCEDURE — 1126F AMNT PAIN NOTED NONE PRSNT: CPT | Performed by: FAMILY MEDICINE

## 2024-08-06 RX ORDER — ALBUTEROL SULFATE 90 UG/1
2 AEROSOL, METERED RESPIRATORY (INHALATION) EVERY 4 HOURS PRN
Qty: 18 G | Refills: 1 | Status: SHIPPED | OUTPATIENT
Start: 2024-08-06

## 2024-08-06 RX ORDER — SUMATRIPTAN 100 MG/1
TABLET, FILM COATED ORAL
Qty: 9 TABLET | Refills: 2 | Status: SHIPPED | OUTPATIENT
Start: 2024-08-06

## 2024-08-06 NOTE — PROGRESS NOTES
Subjective   Jared Simpson is a 29 y.o. male.     History of Present Illness  29-year-old disableD male follow-up      The following portions of the patient's history were reviewed and updated as appropriate: allergies, current medications, past family history, past medical history, past social history, past surgical history, and problem list.    Review of Systems   Respiratory:  Negative for shortness of breath.         Continues to smoke   Cardiovascular:  Negative for chest pain and leg swelling.   Psychiatric/Behavioral:  The patient is nervous/anxious.         Mental health managed by psychiatry       Objective   Physical Exam  Vitals and nursing note reviewed.   Constitutional:       Appearance: Normal appearance.   Eyes:      Extraocular Movements: Extraocular movements intact.      Pupils: Pupils are equal, round, and reactive to light.   Cardiovascular:      Rate and Rhythm: Normal rate and regular rhythm.   Pulmonary:      Effort: Pulmonary effort is normal.      Breath sounds: Normal breath sounds.   Musculoskeletal:      Right lower leg: No edema.      Left lower leg: No edema.   Skin:     General: Skin is warm and dry.   Neurological:      General: No focal deficit present.      Mental Status: He is alert and oriented to person, place, and time.   Psychiatric:         Mood and Affect: Mood normal.         Behavior: Behavior normal.         Assessment & Plan   Diagnoses and all orders for this visit:    1. Mixed hyperlipidemia (Primary)  -     Comprehensive Metabolic Panel  -     Lipid Panel    2. Migraine without aura and without status migrainosus, not intractable  -     SUMAtriptan (IMITREX) 100 MG tablet; Take one tablet at onset of headache. May repeat dose one time in 2 hours if headache not relieved.  Dispense: 9 tablet; Refill: 2    3. Shortness of breath  -     albuterol sulfate  (90 Base) MCG/ACT inhaler; Inhale 2 puffs Every 4 (Four) Hours As Needed for Wheezing.  Dispense: 18 g;  Refill: 1  -     CBC & Differential    4. Sebaceous cyst  -     Ambulatory Referral to General Surgery       Plan-above-continue seeing specialist

## 2024-08-07 LAB
ALBUMIN SERPL-MCNC: 4.8 G/DL (ref 3.5–5.2)
ALBUMIN/GLOB SERPL: 2.3 G/DL
ALP SERPL-CCNC: 100 U/L (ref 39–117)
ALT SERPL-CCNC: 15 U/L (ref 1–41)
AST SERPL-CCNC: 22 U/L (ref 1–40)
BASOPHILS # BLD AUTO: 0.03 10*3/MM3 (ref 0–0.2)
BASOPHILS NFR BLD AUTO: 0.3 % (ref 0–1.5)
BILIRUB SERPL-MCNC: 0.3 MG/DL (ref 0–1.2)
BUN SERPL-MCNC: 9 MG/DL (ref 6–20)
BUN/CREAT SERPL: 10.5 (ref 7–25)
CALCIUM SERPL-MCNC: 9.6 MG/DL (ref 8.6–10.5)
CHLORIDE SERPL-SCNC: 94 MMOL/L (ref 98–107)
CHOLEST SERPL-MCNC: 182 MG/DL (ref 0–200)
CO2 SERPL-SCNC: 25.2 MMOL/L (ref 22–29)
CREAT SERPL-MCNC: 0.86 MG/DL (ref 0.76–1.27)
EGFRCR SERPLBLD CKD-EPI 2021: 118.7 ML/MIN/1.73
EOSINOPHIL # BLD AUTO: 0.2 10*3/MM3 (ref 0–0.4)
EOSINOPHIL NFR BLD AUTO: 2.1 % (ref 0.3–6.2)
ERYTHROCYTE [DISTWIDTH] IN BLOOD BY AUTOMATED COUNT: 13.1 % (ref 12.3–15.4)
GLOBULIN SER CALC-MCNC: 2.1 GM/DL
GLUCOSE SERPL-MCNC: 90 MG/DL (ref 65–99)
HCT VFR BLD AUTO: 47.1 % (ref 37.5–51)
HDLC SERPL-MCNC: 44 MG/DL (ref 40–60)
HGB BLD-MCNC: 15.8 G/DL (ref 13–17.7)
IMM GRANULOCYTES # BLD AUTO: 0.04 10*3/MM3 (ref 0–0.05)
IMM GRANULOCYTES NFR BLD AUTO: 0.4 % (ref 0–0.5)
LDLC SERPL CALC-MCNC: 93 MG/DL (ref 0–100)
LYMPHOCYTES # BLD AUTO: 0.74 10*3/MM3 (ref 0.7–3.1)
LYMPHOCYTES NFR BLD AUTO: 7.7 % (ref 19.6–45.3)
MCH RBC QN AUTO: 32 PG (ref 26.6–33)
MCHC RBC AUTO-ENTMCNC: 33.5 G/DL (ref 31.5–35.7)
MCV RBC AUTO: 95.5 FL (ref 79–97)
MONOCYTES # BLD AUTO: 0.95 10*3/MM3 (ref 0.1–0.9)
MONOCYTES NFR BLD AUTO: 9.8 % (ref 5–12)
NEUTROPHILS # BLD AUTO: 7.7 10*3/MM3 (ref 1.7–7)
NEUTROPHILS NFR BLD AUTO: 79.7 % (ref 42.7–76)
NRBC BLD AUTO-RTO: 0 /100 WBC (ref 0–0.2)
PLATELET # BLD AUTO: 300 10*3/MM3 (ref 140–450)
POTASSIUM SERPL-SCNC: 4.3 MMOL/L (ref 3.5–5.2)
PROT SERPL-MCNC: 6.9 G/DL (ref 6–8.5)
RBC # BLD AUTO: 4.93 10*6/MM3 (ref 4.14–5.8)
SODIUM SERPL-SCNC: 136 MMOL/L (ref 136–145)
TRIGL SERPL-MCNC: 271 MG/DL (ref 0–150)
VLDLC SERPL CALC-MCNC: 45 MG/DL (ref 5–40)
WBC # BLD AUTO: 9.66 10*3/MM3 (ref 3.4–10.8)

## 2024-10-02 ENCOUNTER — TELEPHONE (OUTPATIENT)
Dept: FAMILY MEDICINE CLINIC | Facility: CLINIC | Age: 29
End: 2024-10-02
Payer: MEDICARE

## 2024-10-02 NOTE — TELEPHONE ENCOUNTER
Caller: Lynn Simpson    Relationship: Mother    Best call back number:  [phone]    What form or medical record are you requesting:     LABS THAT WERE REQUESTED BY [facility] 09.18.24    Who is requesting this form or medical record from you:     MOM    How would you like to receive the form or medical records     f - [phone]    Timeframe paperwork needed:     AS SOON AS CAN BE AVAILABLE      PLEASE CALL AND ADVISE

## 2024-10-02 NOTE — TELEPHONE ENCOUNTER
Labs faxed to number provided in previous message.  Phone attempt to advise mother--no answer but left vm stating labs have been faxed to number provided.

## 2024-10-08 ENCOUNTER — OFFICE VISIT (OUTPATIENT)
Dept: FAMILY MEDICINE CLINIC | Facility: CLINIC | Age: 29
End: 2024-10-08
Payer: MEDICARE

## 2024-10-08 DIAGNOSIS — R53.83 OTHER FATIGUE: Primary | ICD-10-CM

## 2024-10-08 NOTE — PROGRESS NOTES
Patient came to office today with an order from mental health doctor, Dr. Ade Siu in [place] for an EKG.  An EKG was done and results were faxed to her office.

## 2025-01-12 DIAGNOSIS — G43.009 MIGRAINE WITHOUT AURA AND WITHOUT STATUS MIGRAINOSUS, NOT INTRACTABLE: ICD-10-CM

## 2025-01-13 RX ORDER — SUMATRIPTAN SUCCINATE 100 MG/1
TABLET ORAL
Qty: 9 TABLET | Refills: 2 | Status: SHIPPED | OUTPATIENT
Start: 2025-01-13

## 2025-01-13 NOTE — TELEPHONE ENCOUNTER
Rx Refill Note  Requested Prescriptions     Pending Prescriptions Disp Refills    SUMAtriptan (IMITREX) 100 MG tablet [Pharmacy Med Name: SUMATRIPTAN SUCC 100 MG TABLET] 9 tablet 2     Sig: TAKE ONE TABLET AT ONSET OF HEADACHE. MAY REPEAT DOSE ONE TIME IN 2 HOURS IF HEADACHE NOT RELIEVED.      Last office visit with prescribing clinician: 7/26/2023   Last telemedicine visit with prescribing clinician: Visit date not found   Next office visit with prescribing clinician: Visit date not found   CPE done                       Would you like a call back once the refill request has been completed: [] Yes [] No    If the office needs to give you a call back, can they leave a voicemail: [] Yes [] No    Tika Garcia MA  01/13/25, 08:31 CST

## 2025-03-09 ENCOUNTER — TELEMEDICINE (OUTPATIENT)
Dept: FAMILY MEDICINE CLINIC | Facility: TELEHEALTH | Age: 30
End: 2025-03-09
Payer: MEDICARE

## 2025-03-09 DIAGNOSIS — J01.00 ACUTE NON-RECURRENT MAXILLARY SINUSITIS: Primary | ICD-10-CM

## 2025-03-09 PROBLEM — R19.7 DIARRHEA: Status: RESOLVED | Noted: 2017-04-03 | Resolved: 2025-03-09

## 2025-03-09 PROBLEM — R10.9 ABDOMINAL CRAMPING: Status: RESOLVED | Noted: 2017-04-03 | Resolved: 2025-03-09

## 2025-03-09 PROCEDURE — 1160F RVW MEDS BY RX/DR IN RCRD: CPT | Performed by: NURSE PRACTITIONER

## 2025-03-09 PROCEDURE — 99213 OFFICE O/P EST LOW 20 MIN: CPT | Performed by: NURSE PRACTITIONER

## 2025-03-09 PROCEDURE — 1159F MED LIST DOCD IN RCRD: CPT | Performed by: NURSE PRACTITIONER

## 2025-03-09 RX ORDER — CLONAZEPAM 0.5 MG/1
TABLET ORAL
COMMUNITY
Start: 2025-02-22

## 2025-03-09 RX ORDER — BROMPHENIRAMINE MALEATE, PSEUDOEPHEDRINE HYDROCHLORIDE, AND DEXTROMETHORPHAN HYDROBROMIDE 2; 30; 10 MG/5ML; MG/5ML; MG/5ML
5 SYRUP ORAL 4 TIMES DAILY PRN
Qty: 140 ML | Refills: 0 | Status: SHIPPED | OUTPATIENT
Start: 2025-03-09 | End: 2025-03-16

## 2025-03-09 RX ORDER — HALOPERIDOL DECANOATE 100 MG/ML
INJECTION INTRAMUSCULAR
COMMUNITY
Start: 2025-03-07

## 2025-03-09 RX ORDER — AZITHROMYCIN 250 MG/1
TABLET, FILM COATED ORAL
Qty: 6 TABLET | Refills: 0 | Status: SHIPPED | OUTPATIENT
Start: 2025-03-09

## 2025-03-10 NOTE — PATIENT INSTRUCTIONS
Sinus Infection, Adult  A sinus infection, also called sinusitis, is inflammation of your sinuses. Sinuses are hollow spaces in the bones around your face. Your sinuses are located:  Around your eyes.  In the middle of your forehead.  Behind your nose.  In your cheekbones.  Mucus normally drains out of your sinuses. When your nasal tissues become inflamed or swollen, mucus can become trapped or blocked. This allows bacteria, viruses, and fungi to grow, which leads to infection. Most infections of the sinuses are caused by a virus.  A sinus infection can develop quickly. It can last for up to 4 weeks (acute) or for more than 12 weeks (chronic). A sinus infection often develops after a cold.  What are the causes?  This condition is caused by anything that creates swelling in the sinuses or stops mucus from draining. This includes:  Allergies.  Asthma.  Infection from bacteria or viruses.  Deformities or blockages in your nose or sinuses.  Abnormal growths in the nose (nasal polyps).  Pollutants, such as chemicals or irritants in the air.  Infection from fungi. This is rare.  What increases the risk?  You are more likely to develop this condition if you:  Have a weak body defense system (immune system).  Do a lot of swimming or diving.  Overuse nasal sprays.  Smoke.  What are the signs or symptoms?  The main symptoms of this condition are pain and a feeling of pressure around the affected sinuses. Other symptoms include:  Stuffy nose or congestion that makes it difficult to breathe through your nose.  Thick yellow or greenish drainage from your nose.  Tenderness, swelling, and warmth over the affected sinuses.  A cough that may get worse at night.  Decreased sense of smell and taste.  Extra mucus that collects in the throat or the back of the nose (postnasal drip) causing a sore throat or bad breath.  Tiredness (fatigue).  Fever.  How is this diagnosed?  This condition is diagnosed based on:  Your symptoms.  Your  medical history.  A physical exam.  Tests to find out if your condition is acute or chronic. This may include:  Checking your nose for nasal polyps.  Viewing your sinuses using a device that has a light (endoscope).  Testing for allergies or bacteria.  Imaging tests, such as an MRI or CT scan.  In rare cases, a bone biopsy may be done to rule out more serious types of fungal sinus disease.  How is this treated?  Treatment for a sinus infection depends on the cause and whether your condition is chronic or acute.  If caused by a virus, your symptoms should go away on their own within 10 days. You may be given medicines to relieve symptoms. They include:  Medicines that shrink swollen nasal passages (decongestants).  A spray that eases inflammation of the nostrils (topical intranasal corticosteroids).  Rinses that help get rid of thick mucus in your nose (nasal saline washes).  Medicines that treat allergies (antihistamines).  Over-the-counter pain relievers.  If caused by bacteria, your health care provider may recommend waiting to see if your symptoms improve. Most bacterial infections will get better without antibiotic medicine. You may be given antibiotics if you have:  A severe infection.  A weak immune system.  If caused by narrow nasal passages or nasal polyps, surgery may be needed.  Follow these instructions at home:  Medicines  Take, use, or apply over-the-counter and prescription medicines only as told by your health care provider. These may include nasal sprays.  If you were prescribed an antibiotic medicine, take it as told by your health care provider. Do not stop taking the antibiotic even if you start to feel better.  Hydrate and humidify    Drink enough fluid to keep your urine pale yellow. Staying hydrated will help to thin your mucus.  Use a cool mist humidifier to keep the humidity level in your home above 50%.  Inhale steam for 10-15 minutes, 3-4 times a day, or as told by your health care  provider. You can do this in the bathroom while a hot shower is running.  Limit your exposure to cool or dry air.  Rest  Rest as much as possible.  Sleep with your head raised (elevated).  Make sure you get enough sleep each night.  General instructions    Apply a warm, moist washcloth to your face 3-4 times a day or as told by your health care provider. This will help with discomfort.  Use nasal saline washes as often as told by your health care provider.  Wash your hands often with soap and water to reduce your exposure to germs. If soap and water are not available, use hand .  Do not smoke. Avoid being around people who are smoking (secondhand smoke).  Keep all follow-up visits. This is important.  Contact a health care provider if:  You have a fever.  Your symptoms get worse.  Your symptoms do not improve within 10 days.  Get help right away if:  You have a severe headache.  You have persistent vomiting.  You have severe pain or swelling around your face or eyes.  You have vision problems.  You develop confusion.  Your neck is stiff.  You have trouble breathing.  These symptoms may be an emergency. Get help right away. Call 911.  Do not wait to see if the symptoms will go away.  Do not drive yourself to the hospital.  Summary  A sinus infection is soreness and inflammation of your sinuses. Sinuses are hollow spaces in the bones around your face.  This condition is caused by nasal tissues that become inflamed or swollen. The swelling traps or blocks the flow of mucus. This allows bacteria, viruses, and fungi to grow, which leads to infection.  If you were prescribed an antibiotic medicine, take it as told by your health care provider. Do not stop taking the antibiotic even if you start to feel better.  Keep all follow-up visits. This is important.  This information is not intended to replace advice given to you by your health care provider. Make sure you discuss any questions you have with your health  care provider.  Document Revised: 11/22/2022 Document Reviewed: 11/22/2022  [employer] Patient Education © 2024 [employer]  How to Quarantine at Home  Information for Patients and Families    These instructions are for people with confirmed or suspected COVID-19 who do not need to be hospitalized and those with confirmed COVID-19 who were hospitalized and discharged to care for themselves at home.    If you were tested through the Health Department  The Health Department will monitor your wellbeing.  If it is determined that you do not need to be hospitalized and can be isolated at home, you will be monitored by staff from your local or state health department.     If you were tested through a Commercial Lab  You will need to monitor yourself and report changes in your symptoms to your doctor.  See the section below called Monitor Your Symptoms.    Follow these steps until a healthcare provider or local or state health department says you can return to your normal activities.    Stay home except to get medical care  Restrict activities outside your home, except for getting medical care.   Do not go to work, school, or public areas.   Avoid using public transportation, ride-sharing, or taxis.    Separate yourself from other people and animals in your home  People  As much as possible, you should stay in a specific room and away from other people in your home. Also, you should use a separate bathroom, if available.    Animals  You should restrict contact with pets and other animals while you are sick with COVID-19, just like you would around other people. When possible, have another member of your household care for your animals while you are sick. If you are sick with COVID-19, avoid contact with your pet, including petting, snuggling, being kissed or licked, and sharing food. If you must care for your pet or be around animals while you are sick, wash your hands before and after you interact with pets and wear a  facemask. See COVID-19 and Animals for more information.    Call ahead before visiting your doctor  If you have a medical appointment, call the healthcare provider and tell them that you have or may have COVID-19. This information will help the healthcare provider’s office take steps to keep other people from getting infected or exposed.    Wear a facemask  You should wear a facemask when you are around other people (e.g., sharing a room or vehicle) or pets and before you enter a healthcare provider’s office.     If you are not able to wear a facemask (for example, because it causes trouble breathing), then people who live with you should not stay in the same room with you, or they should wear a facemask if they enter your room.    Cover your coughs and sneezes  Cover your mouth and nose with a tissue when you cough or sneeze.   Throw used tissues in a lined trash can.   Immediately wash your hands with soap and water for at least 20 seconds or, if soap and water are not available, clean your hands with an alcohol-based hand  that contains at least 60% alcohol.    Clean your hands often  Wash your hands often with soap and water for at least 20 seconds, especially after blowing your nose, coughing, or sneezing; going to the bathroom; and before eating or preparing food.     If soap and water are not readily available, use an alcohol-based hand  with at least 60% alcohol, covering all surfaces of your hands and rubbing them together until they feel dry.    Soap and water are the best option if hands are visibly dirty. Avoid touching your eyes, nose, and mouth with unwashed hands.    Avoid sharing personal household items  You should not share dishes, drinking glasses, cups, eating utensils, towels, or bedding with other people or pets in your home.   After using these items, they should be washed thoroughly with soap and water.    Clean all “high-touch” surfaces everyday  High touch surfaces include  counters, tabletops, doorknobs, bathroom fixtures, toilets, phones, keyboards, tablets, and bedside tables.   Also, clean any surfaces that may have blood, stool, or body fluids on them.   Use a household cleaning spray or wipe, according to the label instructions. Labels contain instructions for safe and effective use of the cleaning product, including precautions you should take when applying the product, such as wearing gloves and making sure you have good ventilation during use of the product.    Monitor your symptoms  Seek prompt medical attention if your illness is worsening (e.g., difficulty breathing).   Before seeking care, call your healthcare provider and tell them that you have, or are being evaluated for, COVID-19.   Put on a facemask before you enter the facility.     These steps will help the healthcare provider’s office to keep other people in the office or waiting room from getting infected or exposed.   Persons who are placed under active monitoring or facilitated self-monitoring should follow instructions provided by their local health department or occupational health professionals, as appropriate.  If you have a medical emergency and need to call 911, notify the dispatch personnel that you have, or are being evaluated for COVID-19. If possible, put on a facemask before emergency medical services arrive.    Discontinuing home isolation  Patients with confirmed COVID-19 should remain under home isolation precautions until the risk of secondary transmission to others is thought to be low. The decision to discontinue home isolation precautions should be made on a case-by-case basis, in consultation with healthcare providers and state and local health departments.    The below content are for household members, intimate partners, and caregivers of a patient with symptomatic laboratory-confirmed COVID-19 or a patient under investigation:    Household members, intimate partners, and caregivers may have  close contact with a person with symptomatic, laboratory-confirmed COVID-19 or a person under investigation.     Close contacts should monitor their health; they should call their healthcare provider right away if they develop symptoms suggestive of COVID-19 (e.g., fever, cough, shortness of breath)     Close contacts should also follow these recommendations:  Make sure that you understand and can help the patient follow their healthcare provider’s instructions for medication(s) and care. You should help the patient with basic needs in the home and provide support for getting groceries, prescriptions, and other personal needs.  Monitor the patient’s symptoms. If the patient is getting sicker, call his or her healthcare provider and tell them that the patient has laboratory-confirmed COVID-19. This will help the healthcare provider’s office take steps to keep other people in the office or waiting room from getting infected. Ask the healthcare provider to call the local or [facility] health department for additional guidance. If the patient has a medical emergency and you need to call 911, notify the dispatch personnel that the patient has, or is being evaluated for COVID-19.  Household members should stay in another room or be  from the patient as much as possible. Household members should use a separate bedroom and bathroom, if available.  Prohibit visitors who do not have an essential need to be in the home.  Household members should care for any pets in the home. Do not handle pets or other animals while sick.  For more information, see COVID-19 and Animals.  Make sure that shared spaces in the home have good air flow, such as by an air conditioner or an opened window, weather permitting.  Perform hand hygiene frequently. Wash your hands often with soap and water for at least 20 seconds or use an alcohol-based hand  that contains 60 to 95% alcohol, covering all surfaces of your hands and rubbing them  together until they feel dry. Soap and water should be used preferentially if hands are visibly dirty.  Avoid touching your eyes, nose, and mouth with unwashed hands.  The patient should wear a facemask when you are around other people. If the patient is not able to wear a facemask (for example, because it causes trouble breathing), you, as the caregiver, should wear a mask when you are in the same room as the patient.  Wear a disposable facemask and gloves when you touch or have contact with the patient’s blood, stool, or body fluids, such as saliva, sputum, nasal mucus, vomit, or urine.   Throw out disposable facemasks and gloves after using them. Do not reuse.  When removing personal protective equipment, first remove and dispose of gloves. Then, immediately clean your hands with soap and water or alcohol-based hand . Next, remove and dispose of facemask, and immediately clean your hands again with soap and water or alcohol-based hand .  Avoid sharing household items with the patient. You should not share dishes, drinking glasses, cups, eating utensils, towels, bedding, or other items. After the patient uses these items, you should wash them thoroughly (see below “Wash laundry thoroughly”).  Clean all “high-touch” surfaces, such as counters, tabletops, doorknobs, bathroom fixtures, toilets, phones, keyboards, tablets, and bedside tables, every day. Also, clean any surfaces that may have blood, stool, or body fluids on them.   Use a household cleaning spray or wipe, according to the label instructions. Labels contain instructions for safe and effective use of the cleaning product including precautions you should take when applying the product, such as wearing gloves and making sure you have good ventilation during use of the product.  Wash laundry thoroughly.   Immediately remove and wash clothes or bedding that have blood, stool, or body fluids on them.  Wear disposable gloves while handling  soiled items and keep soiled items away from your body. Clean your hands (with soap and water or an alcohol-based hand ) immediately after removing your gloves.  Read and follow directions on labels of laundry or clothing items and detergent. In general, using a normal laundry detergent according to washing machine instructions and dry thoroughly using the warmest temperatures recommended on the clothing label.  Place all used disposable gloves, facemasks, and other contaminated items in a lined container before disposing of them with other household waste. Clean your hands (with soap and water or an alcohol-based hand ) immediately after handling these items. Soap and water should be used preferentially if hands are visibly dirty.  Discuss any additional questions with your state or local health department or healthcare provider.    Adapted from information provided by the Centers for Disease Control and Prevention.  For more information, visit https://www.cdc.gov/coronavirus/2019-ncov/hcp/guidance-prevent-spread.html

## 2025-03-10 NOTE — PROGRESS NOTES
You have chosen to receive care through a telehealth visit.  Do you consent to use a video/audio connection for your medical care today? Yes     CHIEF COMPLAINT  Chief Complaint   Patient presents with    Sinus Problem         HPI  Jared Simpson is a 30 y.o. male  presents with complaint of sinus infection and green mucous and cough. Reports symptoms worsened 4 days ago. Reports no fever or chills. Reports no nausea or vomiting. Reports no SOA or CP. Reports he has taken robitussin and cold and flu. Reports steroids makes him anxious and interacts with his mental health medication. Reports he is coughing some. Reports he has not taken a COVID test. Reports he has had sinus surgery in the past and azithromycin is the best medication for his symptoms.     Review of Systems   Constitutional:  Negative for chills, fatigue and fever.   HENT:  Positive for congestion, sinus pressure and sinus pain. Negative for ear discharge, ear pain and sore throat.    Respiratory:  Positive for cough. Negative for chest tightness, shortness of breath and wheezing.    Cardiovascular:  Negative for chest pain.   Gastrointestinal:  Negative for abdominal pain, diarrhea, nausea and vomiting.   Musculoskeletal:  Negative for back pain and myalgias.   Neurological:  Negative for dizziness and headaches.   Psychiatric/Behavioral: Negative.         Past Medical History:   Diagnosis Date    ADHD (attention deficit hyperactivity disorder)     Allergic     Anxiety     Arm injury     right    Depression     Headache     Wrist injury        Family History   Problem Relation Age of Onset    No Known Problems Mother     No Known Problems Father     No Known Problems Brother     Brain cancer Maternal Grandmother     Dementia Maternal Grandfather     No Known Problems Paternal Grandmother     Pancreatic cancer Paternal Grandfather        Social History     Socioeconomic History    Marital status: Single   Tobacco Use    Smoking status: Every Day      Current packs/day: 0.75     Average packs/day: 0.8 packs/day for 16.2 years (12.1 ttl pk-yrs)     Types: Cigarettes     Start date: 2009     Passive exposure: Current    Smokeless tobacco: Never   Vaping Use    Vaping status: Never Used   Substance and Sexual Activity    Alcohol use: Not Currently     Alcohol/week: 6.0 standard drinks of alcohol     Types: 6 Cans of beer per week     Comment: oc.    Drug use: Not Currently     Types: Marijuana     Comment: former    Sexual activity: Defer       Jared Simpson  reports that he has been smoking cigarettes. He started smoking about 16 years ago. He has a 12.1 pack-year smoking history. He has been exposed to tobacco smoke. He has never used smokeless tobacco. I have educated him on the risk of diseases from using tobacco products such as cancer, COPD, and heart disease.     I advised him to quit and he is willing to quit. We have discussed the following method/s for tobacco cessation:  Counseling. He is going to talk with his mental health doctor regarding trying a nicotine patch.    I spent  1  minutes counseling the patient.              There were no vitals taken for this visit.    PHYSICAL EXAM  Physical Exam   Constitutional: He is oriented to person, place, and time. He appears well-developed and well-nourished. No distress.   HENT:   Head: Normocephalic and atraumatic.   Right Ear: Hearing normal.   Left Ear: Hearing normal.   Nose: Congestion present. Right sinus exhibits maxillary sinus tenderness and frontal sinus tenderness. Left sinus exhibits maxillary sinus tenderness and frontal sinus tenderness.   Mouth/Throat: Mouth/Lips are normal.  Patient directed exam   Eyes: Conjunctivae and lids are normal.   Pulmonary/Chest: Effort normal.  No respiratory distress.  Neurological: He is alert and oriented to person, place, and time.   Psychiatric: He has a normal mood and affect. His speech is normal and behavior is normal.           Diagnoses and all orders for  this visit:    1. Acute non-recurrent maxillary sinusitis (Primary)    Other orders  -     brompheniramine-pseudoephedrine-DM 30-2-10 MG/5ML syrup; Take 5 mL by mouth 4 (Four) Times a Day As Needed for Allergies, Congestion or Cough for up to 7 days.  Dispense: 140 mL; Refill: 0  -     azithromycin (Zithromax Z-Italo) 250 MG tablet; Take 2 tablets by mouth on day 1, then 1 tablet daily on days 2-5  Dispense: 6 tablet; Refill: 0    Rest  Fluids  PCP if symptoms continue  Take a COVID test  ER for any worsening symptoms such as high fever, chest pain or shortness of air      FOLLOW-UP  As discussed during visit with PCP/Virtual Care if no improvement or Urgent Care/Emergency Department if worsening of symptoms    Patient verbalizes understanding of medication dosage, comfort measures, instructions for treatment and follow-up.    Marylou Porras, APRN  03/09/2025  20:58 EDT    Mode of Visit: Video  Location of patient: -HOME-  Location of provider: +HOME+  You have chosen to receive care through a telehealth visit.  The patient has signed the video visit consent form.  The visit included audio and video interaction. No technical issues occurred during this visit.      The use of a video visit has been reviewed with the patient and verbal informed consent has been obtained. Myself and Jared Simpson participated in this visit. The patient is located in [address] [place] [address].   I am located in [place], [place]. [employer] Video Client were utilized. I spent 5 minutes in the patient's chart for this visit.         Note Disclaimer: At [facility], we believe that sharing information builds trust and better   relationships. You are receiving this note because you recently visited [facility]. It is possible you   will see health information before a provider has talked with you about it. This kind of information can   be easy to misunderstand. To help you fully understand what it means for your  health, we urge you to   discuss this note with your provider.

## 2025-04-03 DIAGNOSIS — G43.009 MIGRAINE WITHOUT AURA AND WITHOUT STATUS MIGRAINOSUS, NOT INTRACTABLE: ICD-10-CM

## 2025-04-03 RX ORDER — SUMATRIPTAN SUCCINATE 100 MG/1
100 TABLET ORAL
Qty: 9 TABLET | Refills: 2 | Status: SHIPPED | OUTPATIENT
Start: 2025-04-03

## 2025-04-03 NOTE — TELEPHONE ENCOUNTER
Rx Refill Note  Requested Prescriptions     Pending Prescriptions Disp Refills    SUMAtriptan (IMITREX) 100 MG tablet [Pharmacy Med Name: SUMATRIPTAN SUCC 100 MG TABLET] 9 tablet 2     Sig: TAKE ONE TABLET AT ONSET OF HEADACHE. MAY REPEAT DOSE ONE TIME IN 2 HOURS IF HEADACHE NOT RELIEVED.      Last office visit with prescribing clinician: 8/6/2024   Last telemedicine visit with prescribing clinician: Visit date not found   Next office visit with prescribing clinician: 4/9/2025       Tika Johnston MA  04/03/25, 07:33 CDT

## 2025-04-09 ENCOUNTER — OFFICE VISIT (OUTPATIENT)
Dept: FAMILY MEDICINE CLINIC | Facility: CLINIC | Age: 30
End: 2025-04-09
Payer: MEDICARE

## 2025-04-09 VITALS
WEIGHT: 193 LBS | OXYGEN SATURATION: 98 % | DIASTOLIC BLOOD PRESSURE: 93 MMHG | BODY MASS INDEX: 27.02 KG/M2 | HEART RATE: 90 BPM | SYSTOLIC BLOOD PRESSURE: 138 MMHG | TEMPERATURE: 97.8 F | HEIGHT: 71 IN

## 2025-04-09 DIAGNOSIS — M54.89 MIDLINE BACK PAIN, UNSPECIFIED BACK LOCATION, UNSPECIFIED CHRONICITY: ICD-10-CM

## 2025-04-09 DIAGNOSIS — R53.83 FATIGUE, UNSPECIFIED TYPE: ICD-10-CM

## 2025-04-09 DIAGNOSIS — E78.2 MIXED HYPERLIPIDEMIA: ICD-10-CM

## 2025-04-09 DIAGNOSIS — Z13.220 LIPID SCREENING: ICD-10-CM

## 2025-04-09 DIAGNOSIS — Z00.00 MEDICARE ANNUAL WELLNESS VISIT, SUBSEQUENT: Primary | ICD-10-CM

## 2025-04-09 LAB
BILIRUB BLD-MCNC: NEGATIVE MG/DL
CLARITY, POC: CLEAR
COLOR UR: YELLOW
GLUCOSE UR STRIP-MCNC: NEGATIVE MG/DL
KETONES UR QL: NEGATIVE
LEUKOCYTE EST, POC: NEGATIVE
NITRITE UR-MCNC: NEGATIVE MG/ML
PH UR: 7 [PH] (ref 5–8)
PROT UR STRIP-MCNC: NEGATIVE MG/DL
RBC # UR STRIP: NEGATIVE /UL
SP GR UR: 1.01 (ref 1–1.03)
UROBILINOGEN UR QL: NORMAL

## 2025-04-09 RX ORDER — HALOPERIDOL 5 MG/1
1 TABLET ORAL EVERY 12 HOURS SCHEDULED
COMMUNITY
Start: 2025-03-19

## 2025-04-09 NOTE — PROGRESS NOTES
Subjective   The ABCs of the Annual Wellness Visit  Medicare Wellness Visit      Jared Simpson is a 30 y.o. patient who presents for a Medicare Wellness Visit.    The following portions of the patient's history were reviewed and   updated as appropriate: allergies, current medications, past family history, past medical history, past social history, past surgical history, and problem list.    Compared to one year ago, the patient's physical   health is the same.  Compared to one year ago, the patient's mental   health is the same.    Recent Hospitalizations:  He was not admitted to the hospital during the last year.     Current Medical Providers:  Patient Care Team:  Morris Espitia MD as PCP - General (Family Medicine)    Outpatient Medications Prior to Visit   Medication Sig Dispense Refill    albuterol sulfate  (90 Base) MCG/ACT inhaler Inhale 2 puffs Every 4 (Four) Hours As Needed for Wheezing. 18 g 1    azithromycin (Zithromax Z-Italo) 250 MG tablet Take 2 tablets by mouth on day 1, then 1 tablet daily on days 2-5 6 tablet 0    Cholecalciferol (VITAMIN D) 2000 UNITS tablet Take 2 tablets by mouth Daily. Patient takes 4000 units daily      clonazePAM (KlonoPIN) 0.5 MG tablet TAKE 1/2 TABLET BY MOUTH 3 TIMES A DAY AS NEEDED      FLUoxetine (PROzac) 20 MG capsule Take 1 capsule by mouth Every Morning.      haloperidol (HALDOL) 5 MG tablet Take 1 tablet by mouth Every 12 (Twelve) Hours.      haloperidol decanoate (HALDOL DECANOATE) 100 MG/ML injection 100 MG/ML INJECTION EVERY WEEK      hydrOXYzine pamoate (Vistaril) 25 MG capsule Take 1 capsule by mouth Daily.      levETIRAcetam (KEPPRA) 250 MG tablet 2 (Two) Times a Day.      Lurasidone HCl (LATUDA) 20 MG tablet tablet Take 1 tablet by mouth Daily.      mirtazapine (REMERON) 7.5 MG tablet 1 OR 2 AT BEDTIME      propranolol (INDERAL) 10 MG tablet Take 1 tablet by mouth 3 (Three) Times a Day.      SUMAtriptan (IMITREX) 100 MG tablet TAKE ONE TABLET AT  "ONSET OF HEADACHE. MAY REPEAT DOSE ONE TIME IN 2 HOURS IF HEADACHE NOT RELIEVED. 9 tablet 2     No facility-administered medications prior to visit.     No opioid medication identified on active medication list. I have reviewed chart for other potential  high risk medication/s and harmful drug interactions in the elderly.      Aspirin is not on active medication list.  Aspirin use is not indicated based on review of current medical condition/s. Risk of harm outweighs potential benefits.  .    Patient Active Problem List   Diagnosis    Bipolar 1 disorder    Family history of colon cancer     Advance Care Planning Advance Directive is not on file.  ACP discussion was declined by the patient. Patient does not have an advance directive, declines further assistance.            Objective   Vitals:    04/09/25 1538   BP: 138/93   BP Location: Left arm   Patient Position: Sitting   Cuff Size: Adult   Pulse: 90   Temp: 97.8 °F (36.6 °C)   TempSrc: Infrared   SpO2: 98%   Weight: 87.5 kg (193 lb)   Height: 180.3 cm (71\")       Estimated body mass index is 26.92 kg/m² as calculated from the following:    Height as of this encounter: 180.3 cm (71\").    Weight as of this encounter: 87.5 kg (193 lb).    BMI is >= 25 and <30. (Overweight) The following options were offered after discussion;: exercise counseling/recommendations and nutrition counseling/recommendations           Does the patient have evidence of cognitive impairment? No                                                                                                Health  Risk Assessment    Smoking Status:  Social History     Tobacco Use   Smoking Status Every Day    Current packs/day: 0.75    Average packs/day: 0.7 packs/day for 16.3 years (12.2 ttl pk-yrs)    Types: Cigarettes    Start date: 2009    Passive exposure: Current   Smokeless Tobacco Never     Alcohol Consumption:  Social History     Substance and Sexual Activity   Alcohol Use Not Currently    " Alcohol/week: 6.0 standard drinks of alcohol    Types: 6 Cans of beer per week    Comment: oc.       Fall Risk Screen  STEADI Fall Risk Assessment has not been completed.    Depression Screening   The PHQ has not been completed during this encounter.     Health Habits and Functional and Cognitive Screenin/6/2024     3:41 PM   Functional & Cognitive Status   Do you have difficulty preparing food and eating? No    Do you have difficulty bathing yourself, getting dressed or grooming yourself? No    Do you have difficulty using the toilet? No    Do you have difficulty moving around from place to place? No    Do you have trouble with steps or getting out of a bed or a chair? No   Current Diet Well Balanced Diet   Dental Exam Up to date   Eye Exam Up to date   Exercise (times per week) 0 times per week   Current Exercises Include No Regular Exercise   Do you need help using the phone?  No   Are you deaf or do you have serious difficulty hearing?  No   Do you need help to go to places out of walking distance? No   Do you need help shopping? No   Do you need help preparing meals?  No   Do you need help with housework?  No   Do you need help with laundry? No   Do you need help taking your medications? No   Do you need help managing money? No   Do you ever drive or ride in a car without wearing a seat belt? No   Have you felt unusual stress, anger or loneliness in the last month? No   Who do you live with? Other   If you need help, do you have trouble finding someone available to you? No   Have you been bothered in the last four weeks by sexual problems? No   Do you have difficulty concentrating, remembering or making decisions? No       Data saved with a previous flowsheet row definition           Age-appropriate Screening Schedule:  Refer to the list below for future screening recommendations based on patient's age, sex and/or medical conditions. Orders for these recommended tests are listed in the plan section.  The patient has been provided with a written plan.    Health Maintenance List  Health Maintenance   Topic Date Due    TDAP/TD VACCINES (1 - Tdap) Never done    COVID-19 Vaccine (5 - 2024-25 season) 09/01/2024    ANNUAL WELLNESS VISIT  02/06/2025    Pneumococcal Vaccine 0-49 (1 of 2 - PCV) 10/08/2025 (Originally 3/8/2012)    INFLUENZA VACCINE  07/01/2025    LIPID PANEL  08/06/2025    HEPATITIS C SCREENING  Completed                                                                                                                                                CMS Preventative Services Quick Reference  Risk Factors Identified During Encounter  Drug Use/Abuse Identified or Suspected:  Patient to continue seeing psychological specialist    The above risks/problems have been discussed with the patient.  Pertinent information has been shared with the patient in the After Visit Summary.  An After Visit Summary and PPPS were made available to the patient.    Follow Up:   Next Medicare Wellness visit to be scheduled in 1 year.         Additional E&M Note during same encounter follows:  Patient has additional, significant, and separately identifiable condition(s)/problem(s) that require work above and beyond the Medicare Wellness Visit     Chief Complaint  Medicare Wellness-subsequent    Subjective   30-year-old male for Medicare wellness physical    Jared is also being seen today for an annual adult preventative physical exam.     Review of Systems   Respiratory:  Negative for shortness of breath.    Cardiovascular:  Negative for chest pain and leg swelling.   Gastrointestinal:         Colonoscopy 2017   Skin:         Skin cyst-minimal-possible pilonidal cyst    Psychiatric/Behavioral:  The patient is nervous/anxious.         Bipolar 1 disorder   All other systems reviewed and are negative.             Objective   Vital Signs:  /93 (BP Location: Left arm, Patient Position: Sitting, Cuff Size: Adult)   Pulse 90   Temp  "97.8 °F (36.6 °C) (Infrared)   Ht 180.3 cm (71\")   Wt 87.5 kg (193 lb)   SpO2 98%   BMI 26.92 kg/m²   Physical Exam  Vitals and nursing note reviewed.   Constitutional:       Appearance: Normal appearance.   HENT:      Right Ear: Tympanic membrane and ear canal normal.      Left Ear: Tympanic membrane and ear canal normal.      Mouth/Throat:      Mouth: Mucous membranes are moist.   Eyes:      Extraocular Movements: Extraocular movements intact.      Pupils: Pupils are equal, round, and reactive to light.   Neck:      Vascular: No carotid bruit.   Cardiovascular:      Rate and Rhythm: Normal rate and regular rhythm.      Pulses: Normal pulses.      Heart sounds: Normal heart sounds.   Pulmonary:      Effort: Pulmonary effort is normal.      Breath sounds: Normal breath sounds.   Abdominal:      General: Abdomen is flat.      Palpations: Abdomen is soft. There is no mass.   Genitourinary:     Penis: Normal.       Testes: Normal.      Comments: No testicular masses inguinal hernia or adenopathy  Musculoskeletal:      Right lower leg: No edema.      Left lower leg: No edema.   Lymphadenopathy:      Cervical: No cervical adenopathy.   Skin:     General: Skin is warm and dry.   Neurological:      General: No focal deficit present.      Mental Status: He is alert and oriented to person, place, and time.   Psychiatric:         Mood and Affect: Mood normal.         Behavior: Behavior normal.         Thought Content: Thought content normal.         Judgment: Judgment normal.         The following data was reviewed by: Morris Espitia MD on 04/09/2025:           Assessment and Plan      Medicare annual wellness visit, subsequent       Plan above-continue exercising Mediterranean diet exercise and taper cigarettes  Mixed hyperlipidemia       Orders:    Comprehensive Metabolic Panel    Lipid Panel    Fatigue, unspecified type    Orders:    TSH    T4, free    CBC & Differential    Midline back pain, unspecified back " location, unspecified chronicity    Orders:    POC Urinalysis Dipstick, Multipro    Lipid screening                 Follow Up   Return in about 6 months (around 10/9/2025), or if symptoms worsen or fail to improve.  Patient was given instructions and counseling regarding his condition or for health maintenance advice. Please see specific information pulled into the AVS if appropriate.

## 2025-04-09 NOTE — PATIENT INSTRUCTIONS
What is the Mediterranean Diet?  The Mediterranean Diet is a way of eating that emphasizes plant-based foods and healthy fats. You focus on overall eating patterns rather than following strict formulas or calculations.    In general, you’ll eat:    Lots of vegetables, fruit, beans, lentils and nuts.  A good amount of whole grains, like whole-wheat bread and brown rice.  Plenty of extra virgin olive oil (EVOO) as a source of healthy fat.  A good amount of fish, especially fish rich in omega-3 fatty acids.  A moderate amount of natural cheese and yogurt.  Little or no red meat, choosing poultry, fish or beans instead of red meat.  Little or no sweets, sugary drinks or butter.  A moderate amount of wine with meals (but if you don’t already drink, don’t start).  This is how people ate in certain Mediterranean countries in the mid-20th century. Researchers have linked these eating patterns with a reduced risk of coronary artery disease (CAD). Today, healthcare providers recommend this eating plan if you have risk factors for heart disease or to support other aspects of your health.    A dietitian can help you modify your approach as needed based on your medical history, underlying conditions, allergies and preferences.        What are the benefits of the Mediterranean Diet?  The mediterranean diet allows you to focus on overall eating patterns rather than following strict formulas or calculations.  The Mediterranean Diet has many benefits, including:    Lowering your risk of cardiovascular disease, including a heart attack or stroke.  Supporting a body weight that’s healthy for you.  Supporting healthy blood sugar levels, blood pressure and cholesterol.  Lowering your risk of metabolic syndrome.  Supporting a healthy balance of gut microbiota (bacteria and other microorganisms) in your digestive system.  Lowering your risk for certain types of cancer.  Slowing the decline of brain function as you age.  Helping you live  longer.        The Mediterranean Diet has these benefits because it:    Limits saturated fat and trans fat. You need some saturated fat, but only in small amounts. Eating too much saturated fat can raise your LDL (bad) cholesterol. A high LDL raises your risk of plaque buildup in your arteries (atherosclerosis). Trans fat has no health benefits. Both of these “unhealthy fats” can cause inflammation.  Encourages healthy unsaturated fats, including omega-3 fatty acids. Unsaturated fats promote healthy cholesterol levels, support brain health and combat inflammation. Plus, a diet high in unsaturated fats and low in saturated fat promotes healthy blood sugar levels.  Limits sodium. Eating foods high in sodium can raise your blood pressure, putting you at a greater risk for a heart attack or stroke.  Limits refined carbohydrates, including sugar. Foods high in refined carbs can cause your blood sugar to spike. Refined carbs also give you excess calories without much nutritional benefit. For example, such foods often have little or no fiber.  Favors foods high in fiber and antioxidants. These nutrients help reduce inflammation throughout your body. Fiber also helps keep waste moving through your large intestine and helps maintain healthy blood sugar levels. Antioxidants protect you against cancer by warding off free radicals.  The Mediterranean Diet includes many different nutrients that work together to help your body. There’s no single food or ingredient responsible for the Mediterranean Diet’s benefits. Instead, the diet is healthy for you because of the combination of nutrients it provides.    Think of a choir with many people singing. One voice alone might carry part of the tune, but you need all the voices to come together to achieve the full effect. Similarly, the Mediterranean Diet works by giving you an ideal blend of nutrients that harmonize to support your health.    Mediterranean Diet food list  The  Mediterranean Diet encourages you to eat plenty of some foods (like whole grains and vegetables) while limiting others. If you’re planning a grocery store trip, you might wonder which foods to buy. Here are some examples of foods to eat often with the Mediterranean Diet.       Medicare Wellness  Personal Prevention Plan of Service     Date of Office Visit:    Encounter Provider:  Morris Espitia MD  Place of Service:  [facility]  Patient Name: Jared Simpson  :  1995    As part of the Medicare Wellness portion of your visit today, we are providing you with this personalized preventive plan of services (PPPS). This plan is based upon recommendations of the [place] Preventive Services Task Force (USPSTF) and the Advisory Committee on Immunization Practices (ACIP).    This lists the preventive care services that should be considered, and provides dates of when you are due. Items listed as completed are up-to-date and do not require any further intervention.    Health Maintenance   Topic Date Due    TDAP/TD VACCINES (1 - Tdap) Never done    COVID-19 Vaccine (5 - - season) 2024    ANNUAL WELLNESS VISIT  2025    Pneumococcal Vaccine 0-49 (1 of 2 - PCV) 10/08/2025 (Originally 3/8/2012)    INFLUENZA VACCINE  2025    LIPID PANEL  2025    HEPATITIS C SCREENING  Completed       No orders of the defined types were placed in this encounter.      No follow-ups on file.

## 2025-04-11 LAB
ALBUMIN SERPL-MCNC: 4.8 G/DL (ref 3.5–5.2)
ALBUMIN/GLOB SERPL: 1.9 G/DL
ALP SERPL-CCNC: 101 U/L (ref 39–117)
ALT SERPL-CCNC: 20 U/L (ref 1–41)
AST SERPL-CCNC: 22 U/L (ref 1–40)
BASOPHILS # BLD AUTO: 0.03 10*3/MM3 (ref 0–0.2)
BASOPHILS NFR BLD AUTO: 0.3 % (ref 0–1.5)
BILIRUB SERPL-MCNC: 0.2 MG/DL (ref 0–1.2)
BUN SERPL-MCNC: 11 MG/DL (ref 6–20)
BUN/CREAT SERPL: 12.2 (ref 7–25)
CALCIUM SERPL-MCNC: 9.9 MG/DL (ref 8.6–10.5)
CHLORIDE SERPL-SCNC: 95 MMOL/L (ref 98–107)
CHOLEST SERPL-MCNC: 217 MG/DL (ref 0–200)
CO2 SERPL-SCNC: 26.8 MMOL/L (ref 22–29)
CREAT SERPL-MCNC: 0.9 MG/DL (ref 0.76–1.27)
EGFRCR SERPLBLD CKD-EPI 2021: 116.4 ML/MIN/1.73
EOSINOPHIL # BLD AUTO: 0.22 10*3/MM3 (ref 0–0.4)
EOSINOPHIL NFR BLD AUTO: 2.1 % (ref 0.3–6.2)
ERYTHROCYTE [DISTWIDTH] IN BLOOD BY AUTOMATED COUNT: 12.6 % (ref 12.3–15.4)
GLOBULIN SER CALC-MCNC: 2.5 GM/DL
GLUCOSE SERPL-MCNC: 78 MG/DL (ref 65–99)
HCT VFR BLD AUTO: 46.4 % (ref 37.5–51)
HDLC SERPL-MCNC: 44 MG/DL (ref 40–60)
HGB BLD-MCNC: 16 G/DL (ref 13–17.7)
IMM GRANULOCYTES # BLD AUTO: 0.03 10*3/MM3 (ref 0–0.05)
IMM GRANULOCYTES NFR BLD AUTO: 0.3 % (ref 0–0.5)
LDLC SERPL CALC-MCNC: 119 MG/DL (ref 0–100)
LYMPHOCYTES # BLD AUTO: 1.62 10*3/MM3 (ref 0.7–3.1)
LYMPHOCYTES NFR BLD AUTO: 15.1 % (ref 19.6–45.3)
MCH RBC QN AUTO: 33.4 PG (ref 26.6–33)
MCHC RBC AUTO-ENTMCNC: 34.5 G/DL (ref 31.5–35.7)
MCV RBC AUTO: 96.9 FL (ref 79–97)
MONOCYTES # BLD AUTO: 0.78 10*3/MM3 (ref 0.1–0.9)
MONOCYTES NFR BLD AUTO: 7.3 % (ref 5–12)
NEUTROPHILS # BLD AUTO: 8.04 10*3/MM3 (ref 1.7–7)
NEUTROPHILS NFR BLD AUTO: 74.9 % (ref 42.7–76)
NRBC BLD AUTO-RTO: 0 /100 WBC (ref 0–0.2)
PLATELET # BLD AUTO: 323 10*3/MM3 (ref 140–450)
POTASSIUM SERPL-SCNC: 4.2 MMOL/L (ref 3.5–5.2)
PROT SERPL-MCNC: 7.3 G/DL (ref 6–8.5)
RBC # BLD AUTO: 4.79 10*6/MM3 (ref 4.14–5.8)
SODIUM SERPL-SCNC: 133 MMOL/L (ref 136–145)
T4 FREE SERPL-MCNC: 1.04 NG/DL (ref 0.92–1.68)
TRIGL SERPL-MCNC: 307 MG/DL (ref 0–150)
TSH SERPL DL<=0.005 MIU/L-ACNC: 2.06 UIU/ML (ref 0.27–4.2)
VLDLC SERPL CALC-MCNC: 54 MG/DL (ref 5–40)
WBC # BLD AUTO: 10.72 10*3/MM3 (ref 3.4–10.8)

## 2025-04-12 ENCOUNTER — TELEMEDICINE (OUTPATIENT)
Dept: FAMILY MEDICINE CLINIC | Facility: TELEHEALTH | Age: 30
End: 2025-04-12
Payer: MEDICARE

## 2025-04-12 VITALS — TEMPERATURE: 98.4 F

## 2025-04-12 DIAGNOSIS — J01.20 ACUTE ETHMOIDAL SINUSITIS, RECURRENCE NOT SPECIFIED: Primary | ICD-10-CM

## 2025-04-12 RX ORDER — GUAIFENESIN 600 MG/1
600 TABLET, EXTENDED RELEASE ORAL 2 TIMES DAILY
Qty: 28 TABLET | Refills: 0 | Status: SHIPPED | OUTPATIENT
Start: 2025-04-12 | End: 2025-04-26

## 2025-04-12 RX ORDER — AMOXICILLIN 875 MG/1
875 TABLET, COATED ORAL 2 TIMES DAILY
Qty: 14 TABLET | Refills: 0 | Status: SHIPPED | OUTPATIENT
Start: 2025-04-12 | End: 2025-04-19

## 2025-04-12 NOTE — PROGRESS NOTES
Mode of Visit: Video  Location of patient: -HOME-  Location of provider: +HOME+  You have chosen to receive care through a telehealth visit.  The patient has signed the video visit consent form.  The visit included audio and video interaction. No technical issues occurred during this visit.    HPI  Jared Simpson is a 30 y.o. male  presents with complaint of cough, congestion.  The patient reports that he has ongoing congestion, green mucous and cough.  The patient reports that he was treated for a sinus infection about a month ago with azithromycin and Bromfed.  Additionally he reports that it got better but did not go all the way away and now it has come back all the way.  He reports lime green nasal congestion and ethmoid sinus pain and pressure.  All symptoms he is having are noted in the ROS portion of this visit.  Over-the-counter he has taken Benadryl and Tylenol for his symptoms.    Review of Systems   Constitutional:  Negative for fever.   HENT:  Positive for congestion (lime green), postnasal drip, sinus pressure (ethmoid) and sinus pain (ethmoid). Negative for sore throat.    Respiratory:  Positive for cough and shortness of breath (this am). Negative for wheezing.    Musculoskeletal:  Negative for myalgias.   Neurological:  Positive for headaches (slight).       Past Medical History:   Diagnosis Date    ADHD (attention deficit hyperactivity disorder)     Allergic     Anxiety     Arm injury     right    Depression     Headache     Wrist injury        Family History   Problem Relation Age of Onset    No Known Problems Mother     No Known Problems Father     No Known Problems Brother     Brain cancer Maternal Grandmother     Dementia Maternal Grandfather     No Known Problems Paternal Grandmother     Pancreatic cancer Paternal Grandfather        Social History     Socioeconomic History    Marital status: Single   Tobacco Use    Smoking status: Every Day     Current packs/day: 0.75     Average packs/day: 0.8  packs/day for 16.3 years (12.2 ttl pk-yrs)     Types: Cigarettes     Start date: 2009     Passive exposure: Current    Smokeless tobacco: Never   Vaping Use    Vaping status: Never Used   Substance and Sexual Activity    Alcohol use: Not Currently     Alcohol/week: 6.0 standard drinks of alcohol     Types: 6 Cans of beer per week     Comment: oc.    Drug use: Not Currently     Types: Marijuana     Comment: former    Sexual activity: Defer       Jared Simpson  reports that he has been smoking cigarettes. He started smoking about 16 years ago. He has a 12.2 pack-year smoking history. He has been exposed to tobacco smoke. He has never used smokeless tobacco. I have educated him on the risk of diseases from using tobacco products such as cancer, COPD, and heart disease.     I advised him to quit and he is not willing to quit.    I spent  1  minutes counseling the patient.    Working with psychiatrist on it.        Temp 98.4 °F (36.9 °C)     PHYSICAL EXAM  Physical Exam   Constitutional: He is oriented to person, place, and time. He appears well-developed.   HENT:   Head: Normocephalic and atraumatic.   Nose: Congestion present.   Ethmoid sinus tenderness-patient directed exam   Eyes: Lids are normal. Right eye exhibits no discharge. Left eye exhibits no discharge. Right conjunctiva is not injected. Left conjunctiva is not injected.   Pulmonary/Chest:  No respiratory distress.  Neurological: He is alert and oriented to person, place, and time. No cranial nerve deficit.   Psychiatric: He has a normal mood and affect. His speech is normal and behavior is normal. Judgment and thought content normal.       Results for orders placed or performed in visit on 04/09/25   TSH    Collection Time: 04/09/25  3:57 PM    Specimen: Blood   Result Value Ref Range    TSH 2.060 0.270 - 4.200 uIU/mL   T4, free    Collection Time: 04/09/25  3:57 PM    Specimen: Blood   Result Value Ref Range    Free T4 1.04 0.92 - 1.68 ng/dL   Comprehensive  Metabolic Panel    Collection Time: 04/09/25  3:57 PM    Specimen: Blood   Result Value Ref Range    Glucose 78 65 - 99 mg/dL    BUN 11 6 - 20 mg/dL    Creatinine 0.90 0.76 - 1.27 mg/dL    EGFR Result 116.4 >60.0 mL/min/1.73    BUN/Creatinine Ratio 12.2 7.0 - 25.0    Sodium 133 (L) 136 - 145 mmol/L    Potassium 4.2 3.5 - 5.2 mmol/L    Chloride 95 (L) 98 - 107 mmol/L    Total CO2 26.8 22.0 - 29.0 mmol/L    Calcium 9.9 8.6 - 10.5 mg/dL    Total Protein 7.3 6.0 - 8.5 g/dL    Albumin 4.8 3.5 - 5.2 g/dL    Globulin 2.5 gm/dL    A/G Ratio 1.9 g/dL    Total Bilirubin 0.2 0.0 - 1.2 mg/dL    Alkaline Phosphatase 101 39 - 117 U/L    AST (SGOT) 22 1 - 40 U/L    ALT (SGPT) 20 1 - 41 U/L   Lipid Panel    Collection Time: 04/09/25  3:57 PM    Specimen: Blood   Result Value Ref Range    Total Cholesterol 217 (H) 0 - 200 mg/dL    Triglycerides 307 (H) 0 - 150 mg/dL    HDL Cholesterol 44 40 - 60 mg/dL    VLDL Cholesterol [place] 54 (H) 5 - 40 mg/dL    LDL Chol Calc (NIH) 119 (H) 0 - 100 mg/dL   CBC & Differential    Collection Time: 04/09/25  3:57 PM    Specimen: Blood   Result Value Ref Range    WBC 10.72 3.40 - 10.80 10*3/mm3    RBC 4.79 4.14 - 5.80 10*6/mm3    Hemoglobin 16.0 13.0 - 17.7 g/dL    Hematocrit 46.4 37.5 - 51.0 %    MCV 96.9 79.0 - 97.0 fL    MCH 33.4 (H) 26.6 - 33.0 pg    MCHC 34.5 31.5 - 35.7 g/dL    RDW 12.6 12.3 - 15.4 %    Platelets 323 140 - 450 10*3/mm3    Neutrophil Rel % 74.9 42.7 - 76.0 %    Lymphocyte Rel % 15.1 (L) 19.6 - 45.3 %    Monocyte Rel % 7.3 5.0 - 12.0 %    Eosinophil Rel % 2.1 0.3 - 6.2 %    Basophil Rel % 0.3 0.0 - 1.5 %    Neutrophils Absolute 8.04 (H) 1.70 - 7.00 10*3/mm3    Lymphocytes Absolute 1.62 0.70 - 3.10 10*3/mm3    Monocytes Absolute 0.78 0.10 - 0.90 10*3/mm3    Eosinophils Absolute 0.22 0.00 - 0.40 10*3/mm3    Basophils Absolute 0.03 0.00 - 0.20 10*3/mm3    Immature Granulocyte Rel % 0.3 0.0 - 0.5 %    Immature Grans Absolute 0.03 0.00 - 0.05 10*3/mm3    nRBC 0.0 0.0 - 0.2 /100 WBC   POC  Urinalysis Dipstick, Multipro    Collection Time: 04/09/25  4:51 PM    Specimen: Urine   Result Value Ref Range    Color Yellow Yellow, Straw, Dark Yellow, Zofia    Clarity, UA Clear Clear    Glucose, UA Negative Negative mg/dL    Bilirubin Negative Negative    Ketones, UA Negative Negative    Specific Gravity  1.010 1.005 - 1.030    Blood, UA Negative Negative    pH, Urine 7.0 5.0 - 8.0    Protein, POC Negative Negative mg/dL    Urobilinogen, UA 0.2 E.U./dL Normal, 0.2 E.U./dL    Nitrite, UA Negative Negative    Leukocytes Negative Negative       Diagnoses and all orders for this visit:    1. Acute ethmoidal sinusitis, recurrence not specified (Primary)    Other orders  -     guaiFENesin (Mucinex) 600 MG 12 hr tablet; Take 1 tablet by mouth 2 (Two) Times a Day for 14 days.  Dispense: 28 tablet; Refill: 0  -     amoxicillin (AMOXIL) 875 MG tablet; Take 1 tablet by mouth 2 (Two) Times a Day for 7 days.  Dispense: 14 tablet; Refill: 0    Amoxicillin as directed  Probiotics for one month related to taking antibiotics. The pharmacist can help you with this if needed. Do not take within two hours of antibiotic.  Mucinex with plenty of fluids especially water to thin secretions to help clear chest congestion   May take Tylenol or ibuprofen for pain or fever  Hydrate well    FOLLOW-UP  If symptoms worsen or persist follow up with PCP, Virtual Care or Urgent Care    Patient verbalizes understanding of medication dosage, comfort measures, instructions for treatment and follow-up.    Maria Esther Low, GENESIS  04/12/2025  17:48 EDT    The use of a video visit has been reviewed with the patient and verbal informed consent has been obtained. Myself and Jared Simpson participated in this visit. The patient is located in [address] [place] [address].    I am located in [place], [place]. [employer] Video Client were utilized. I spent 22 minutes in the patient's chart for this visit.

## (undated) DEVICE — FRCP BX RADJAW4 NDL 2.8 240 STD OG

## (undated) DEVICE — MSK O2 MD CONCENTR A/ LF 7FT 1P/U

## (undated) DEVICE — SENSR O2 OXIMAX FNGR A/ 18IN NONSTR

## (undated) DEVICE — Device: Brand: DEFENDO AIR/WATER/SUCTION AND BIOPSY VALVE

## (undated) DEVICE — ENDOGATOR AUXILIARY WATER JET CONNECTOR: Brand: ENDOGATOR

## (undated) DEVICE — THE CHANNEL CLEANING BRUSH IS A NYLON FLEXI BRUSH ATTACHED TO A FLEXIBLE PLASTIC SHEATH DESIGNED TO SAFELY REMOVE DEBRIS FROM FLEXIBLE ENDOSCOPES.

## (undated) DEVICE — TBG SMPL FLTR LINE NASL 02/C02 A/ BX/100

## (undated) DEVICE — CUFF,BP,DISP,1 TUBE,ADULT,HP: Brand: MEDLINE